# Patient Record
Sex: FEMALE | Race: WHITE | NOT HISPANIC OR LATINO | Employment: FULL TIME | URBAN - METROPOLITAN AREA
[De-identification: names, ages, dates, MRNs, and addresses within clinical notes are randomized per-mention and may not be internally consistent; named-entity substitution may affect disease eponyms.]

---

## 2017-01-03 ENCOUNTER — GENERIC CONVERSION - ENCOUNTER (OUTPATIENT)
Dept: OTHER | Facility: OTHER | Age: 32
End: 2017-01-03

## 2017-01-03 ENCOUNTER — ALLSCRIPTS OFFICE VISIT (OUTPATIENT)
Dept: OTHER | Facility: OTHER | Age: 32
End: 2017-01-03

## 2017-01-09 ENCOUNTER — HOSPITAL ENCOUNTER (OUTPATIENT)
Facility: HOSPITAL | Age: 32
Discharge: HOME | End: 2017-01-09
Attending: OBSTETRICS & GYNECOLOGY | Admitting: OBSTETRICS & GYNECOLOGY
Payer: COMMERCIAL

## 2017-01-09 VITALS
SYSTOLIC BLOOD PRESSURE: 103 MMHG | HEART RATE: 78 BPM | TEMPERATURE: 97.8 F | WEIGHT: 192 LBS | DIASTOLIC BLOOD PRESSURE: 58 MMHG

## 2017-01-09 DIAGNOSIS — Z33.1 PREGNANT STATE, INCIDENTAL: ICD-10-CM
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
## 2017-01-09 PROBLEM — Z3A.30 30 WEEKS GESTATION OF PREGNANCY: Status: ACTIVE | Noted: 2017-01-09

## 2017-01-09 PROCEDURE — 99204 OFFICE O/P NEW MOD 45 MIN: CPT

## 2017-01-09 RX ORDER — MULTIVITAMIN
1 TABLET ORAL DAILY
COMMUNITY
End: 2017-02-14 | Stop reason: ALTCHOICE

## 2017-01-20 ENCOUNTER — GENERIC CONVERSION - ENCOUNTER (OUTPATIENT)
Dept: OTHER | Facility: OTHER | Age: 32
End: 2017-01-20

## 2017-01-30 ENCOUNTER — GENERIC CONVERSION - ENCOUNTER (OUTPATIENT)
Dept: OTHER | Facility: OTHER | Age: 32
End: 2017-01-30

## 2017-02-03 ENCOUNTER — GENERIC CONVERSION - ENCOUNTER (OUTPATIENT)
Dept: OTHER | Facility: OTHER | Age: 32
End: 2017-02-03

## 2017-02-17 ENCOUNTER — ALLSCRIPTS OFFICE VISIT (OUTPATIENT)
Dept: OTHER | Facility: OTHER | Age: 32
End: 2017-02-17

## 2017-02-19 LAB — STREP GRP B, MOLECULAR (HISTORICAL): NEGATIVE

## 2017-02-20 LAB — EXTERNAL GROUP B STREP ANTIGEN: NEGATIVE

## 2017-02-24 ENCOUNTER — ALLSCRIPTS OFFICE VISIT (OUTPATIENT)
Dept: OTHER | Facility: OTHER | Age: 32
End: 2017-02-24

## 2017-02-27 ENCOUNTER — GENERIC CONVERSION - ENCOUNTER (OUTPATIENT)
Dept: OTHER | Facility: OTHER | Age: 32
End: 2017-02-27

## 2017-03-07 ENCOUNTER — GENERIC CONVERSION - ENCOUNTER (OUTPATIENT)
Dept: OTHER | Facility: OTHER | Age: 32
End: 2017-03-07

## 2017-03-15 ENCOUNTER — GENERIC CONVERSION - ENCOUNTER (OUTPATIENT)
Dept: OTHER | Facility: OTHER | Age: 32
End: 2017-03-15

## 2017-03-21 ENCOUNTER — GENERIC CONVERSION - ENCOUNTER (OUTPATIENT)
Dept: OTHER | Facility: OTHER | Age: 32
End: 2017-03-21

## 2017-03-23 ENCOUNTER — ANESTHESIA EVENT (INPATIENT)
Dept: LABOR AND DELIVERY | Facility: HOSPITAL | Age: 32
End: 2017-03-23
Payer: COMMERCIAL

## 2017-03-23 ENCOUNTER — HOSPITAL ENCOUNTER (INPATIENT)
Facility: HOSPITAL | Age: 32
LOS: 2 days | Discharge: HOME | End: 2017-03-25
Attending: OBSTETRICS & GYNECOLOGY | Admitting: OBSTETRICS & GYNECOLOGY
Payer: COMMERCIAL

## 2017-03-23 ENCOUNTER — ANESTHESIA (INPATIENT)
Dept: LABOR AND DELIVERY | Facility: HOSPITAL | Age: 32
End: 2017-03-23
Payer: COMMERCIAL

## 2017-03-23 ENCOUNTER — HOSPITAL ENCOUNTER (OUTPATIENT)
Dept: LABOR AND DELIVERY | Facility: HOSPITAL | Age: 32
Discharge: HOME | End: 2017-03-23
Payer: COMMERCIAL

## 2017-03-23 DIAGNOSIS — Z3A.41 41 WEEKS GESTATION OF PREGNANCY: Primary | ICD-10-CM

## 2017-03-23 PROBLEM — O48.0 41 WEEKS GESTATION OF PREGNANCY: Status: ACTIVE | Noted: 2017-03-23

## 2017-03-23 LAB
ABO GROUP BLD: NORMAL
BASE EXCESS BLDCOA CALC-SCNC: -3.4 MMOL/L (ref 3–11)
BASE EXCESS BLDCOV CALC-SCNC: -4.5 MMOL/L (ref 1–9)
BLD GP AB SCN SERPL QL: NEGATIVE
ERYTHROCYTE [DISTWIDTH] IN BLOOD BY AUTOMATED COUNT: 13.6 % (ref 11.6–15.1)
HCO3 BLDCOA-SCNC: 25.4 MMOL/L (ref 17.3–27.3)
HCO3 BLDCOV-SCNC: 22.3 MMOL/L (ref 12.2–28.6)
HCT VFR BLD AUTO: 36.2 % (ref 34.8–46.1)
HGB BLD-MCNC: 12 G/DL (ref 11.5–15.4)
MCH RBC QN AUTO: 31.2 PG (ref 26.8–34.3)
MCHC RBC AUTO-ENTMCNC: 33.1 G/DL (ref 31.4–37.4)
MCV RBC AUTO: 94 FL (ref 82–98)
O2 CT VFR BLDCOA CALC: 4 ML/DL
OXYHGB MFR BLDCOA: 16.4 %
OXYHGB MFR BLDCOV: 41 %
PCO2 BLDCOA: 60 MM[HG] (ref 30–60)
PCO2 BLDCOV: 46.6 MM HG (ref 27–43)
PH BLDCOA: 7.24 [PH] (ref 7.23–7.43)
PH BLDCOV: 7.3 [PH] (ref 7.19–7.49)
PLATELET # BLD AUTO: 238 THOUSANDS/UL (ref 149–390)
PMV BLD AUTO: 10.5 FL (ref 8.9–12.7)
PO2 BLDCOA: 13 MM HG (ref 5–25)
PO2 BLDCOV: 21.3 MM HG (ref 15–45)
RBC # BLD AUTO: 3.85 MILLION/UL (ref 3.81–5.12)
RH BLD: POSITIVE
SAO2 % BLDCOV: 9.8 ML/DL
WBC # BLD AUTO: 11.14 THOUSAND/UL (ref 4.31–10.16)

## 2017-03-23 PROCEDURE — 86592 SYPHILIS TEST NON-TREP QUAL: CPT | Performed by: OBSTETRICS & GYNECOLOGY

## 2017-03-23 PROCEDURE — 86900 BLOOD TYPING SEROLOGIC ABO: CPT | Performed by: OBSTETRICS & GYNECOLOGY

## 2017-03-23 PROCEDURE — 86850 RBC ANTIBODY SCREEN: CPT | Performed by: OBSTETRICS & GYNECOLOGY

## 2017-03-23 PROCEDURE — 85027 COMPLETE CBC AUTOMATED: CPT | Performed by: OBSTETRICS & GYNECOLOGY

## 2017-03-23 PROCEDURE — 82805 BLOOD GASES W/O2 SATURATION: CPT | Performed by: OBSTETRICS & GYNECOLOGY

## 2017-03-23 PROCEDURE — 86901 BLOOD TYPING SEROLOGIC RH(D): CPT | Performed by: OBSTETRICS & GYNECOLOGY

## 2017-03-23 RX ORDER — ONDANSETRON 2 MG/ML
4 INJECTION INTRAMUSCULAR; INTRAVENOUS EVERY 4 HOURS PRN
Status: DISCONTINUED | OUTPATIENT
Start: 2017-03-23 | End: 2017-03-23

## 2017-03-23 RX ORDER — ECHINACEA PURPUREA EXTRACT 125 MG
1 TABLET ORAL AS NEEDED
Status: DISCONTINUED | OUTPATIENT
Start: 2017-03-23 | End: 2017-03-25 | Stop reason: HOSPADM

## 2017-03-23 RX ORDER — OXYCODONE HYDROCHLORIDE AND ACETAMINOPHEN 5; 325 MG/1; MG/1
1 TABLET ORAL EVERY 4 HOURS PRN
Status: DISCONTINUED | OUTPATIENT
Start: 2017-03-23 | End: 2017-03-25 | Stop reason: HOSPADM

## 2017-03-23 RX ORDER — ACETAMINOPHEN 325 MG/1
650 TABLET ORAL EVERY 6 HOURS PRN
Status: DISCONTINUED | OUTPATIENT
Start: 2017-03-23 | End: 2017-03-25 | Stop reason: HOSPADM

## 2017-03-23 RX ORDER — GUAIFENESIN/DEXTROMETHORPHAN 100-10MG/5
10 SYRUP ORAL EVERY 4 HOURS PRN
Status: DISCONTINUED | OUTPATIENT
Start: 2017-03-23 | End: 2017-03-25 | Stop reason: HOSPADM

## 2017-03-23 RX ORDER — ONDANSETRON 2 MG/ML
4 INJECTION INTRAMUSCULAR; INTRAVENOUS EVERY 8 HOURS PRN
Status: DISCONTINUED | OUTPATIENT
Start: 2017-03-23 | End: 2017-03-25 | Stop reason: HOSPADM

## 2017-03-23 RX ORDER — IBUPROFEN 600 MG/1
600 TABLET ORAL EVERY 6 HOURS PRN
Status: DISCONTINUED | OUTPATIENT
Start: 2017-03-23 | End: 2017-03-25 | Stop reason: HOSPADM

## 2017-03-23 RX ORDER — CALCIUM CARBONATE 200(500)MG
1000 TABLET,CHEWABLE ORAL DAILY PRN
Status: DISCONTINUED | OUTPATIENT
Start: 2017-03-23 | End: 2017-03-25 | Stop reason: HOSPADM

## 2017-03-23 RX ORDER — ECHINACEA PURPUREA EXTRACT 125 MG
1 TABLET ORAL
Status: DISCONTINUED | OUTPATIENT
Start: 2017-03-23 | End: 2017-03-23

## 2017-03-23 RX ORDER — OXYCODONE HYDROCHLORIDE AND ACETAMINOPHEN 5; 325 MG/1; MG/1
2 TABLET ORAL EVERY 4 HOURS PRN
Status: DISCONTINUED | OUTPATIENT
Start: 2017-03-23 | End: 2017-03-25 | Stop reason: HOSPADM

## 2017-03-23 RX ORDER — DIPHENHYDRAMINE HYDROCHLORIDE 50 MG/ML
25 INJECTION INTRAMUSCULAR; INTRAVENOUS EVERY 6 HOURS PRN
Status: DISCONTINUED | OUTPATIENT
Start: 2017-03-23 | End: 2017-03-23

## 2017-03-23 RX ORDER — DOCUSATE SODIUM 100 MG/1
100 CAPSULE, LIQUID FILLED ORAL 2 TIMES DAILY
Status: DISCONTINUED | OUTPATIENT
Start: 2017-03-23 | End: 2017-03-25 | Stop reason: HOSPADM

## 2017-03-23 RX ORDER — OXYTOCIN/RINGER'S LACTATE 30/500 ML
1-30 PLASTIC BAG, INJECTION (ML) INTRAVENOUS
Status: DISCONTINUED | OUTPATIENT
Start: 2017-03-23 | End: 2017-03-23

## 2017-03-23 RX ORDER — DIAPER,BRIEF,INFANT-TODD,DISP
1 EACH MISCELLANEOUS AS NEEDED
Status: DISCONTINUED | OUTPATIENT
Start: 2017-03-23 | End: 2017-03-25 | Stop reason: HOSPADM

## 2017-03-23 RX ORDER — BUPIVACAINE HYDROCHLORIDE 2.5 MG/ML
INJECTION, SOLUTION INFILTRATION; PERINEURAL AS NEEDED
Status: DISCONTINUED | OUTPATIENT
Start: 2017-03-23 | End: 2017-03-23 | Stop reason: SURG

## 2017-03-23 RX ORDER — BUTORPHANOL TARTRATE 1 MG/ML
1 INJECTION, SOLUTION INTRAMUSCULAR; INTRAVENOUS ONCE
Status: COMPLETED | OUTPATIENT
Start: 2017-03-23 | End: 2017-03-23

## 2017-03-23 RX ORDER — PROMETHAZINE HYDROCHLORIDE 25 MG/ML
12.5 INJECTION, SOLUTION INTRAMUSCULAR; INTRAVENOUS ONCE
Status: COMPLETED | OUTPATIENT
Start: 2017-03-23 | End: 2017-03-23

## 2017-03-23 RX ORDER — FENTANYL CITRATE 50 UG/ML
INJECTION, SOLUTION INTRAMUSCULAR; INTRAVENOUS
Status: COMPLETED
Start: 2017-03-23 | End: 2017-03-23

## 2017-03-23 RX ORDER — METOCLOPRAMIDE HYDROCHLORIDE 5 MG/ML
5 INJECTION INTRAMUSCULAR; INTRAVENOUS EVERY 6 HOURS PRN
Status: DISCONTINUED | OUTPATIENT
Start: 2017-03-23 | End: 2017-03-23

## 2017-03-23 RX ORDER — SODIUM CHLORIDE, SODIUM LACTATE, POTASSIUM CHLORIDE, CALCIUM CHLORIDE 600; 310; 30; 20 MG/100ML; MG/100ML; MG/100ML; MG/100ML
125 INJECTION, SOLUTION INTRAVENOUS CONTINUOUS
Status: DISCONTINUED | OUTPATIENT
Start: 2017-03-23 | End: 2017-03-23

## 2017-03-23 RX ADMIN — Medication 8 ML/HR: at 15:56

## 2017-03-23 RX ADMIN — BUPIVACAINE HYDROCHLORIDE 1 ML: 2.5 INJECTION, SOLUTION INFILTRATION; PERINEURAL at 15:56

## 2017-03-23 RX ADMIN — IBUPROFEN 600 MG: 600 TABLET, FILM COATED ORAL at 21:02

## 2017-03-23 RX ADMIN — MENTHOL 5.4 MG: 5.4 LOZENGE ORAL at 22:02

## 2017-03-23 RX ADMIN — SODIUM CHLORIDE, SODIUM LACTATE, POTASSIUM CHLORIDE, AND CALCIUM CHLORIDE 125 ML/HR: .6; .31; .03; .02 INJECTION, SOLUTION INTRAVENOUS at 10:25

## 2017-03-23 RX ADMIN — WITCH HAZEL 1 PAD: 500 SOLUTION RECTAL; TOPICAL at 20:51

## 2017-03-23 RX ADMIN — Medication 1 SPRAY: at 10:05

## 2017-03-23 RX ADMIN — BENZOCAINE AND MENTHOL: 20; .5 SPRAY TOPICAL at 20:51

## 2017-03-23 RX ADMIN — BUPIVACAINE HYDROCHLORIDE 5 ML: 2.5 INJECTION, SOLUTION INFILTRATION; PERINEURAL at 16:46

## 2017-03-23 RX ADMIN — FENTANYL CITRATE 10 MCG: 50 INJECTION INTRAMUSCULAR; INTRAVENOUS at 15:56

## 2017-03-23 RX ADMIN — SODIUM CHLORIDE, SODIUM LACTATE, POTASSIUM CHLORIDE, AND CALCIUM CHLORIDE 125 ML/HR: .6; .31; .03; .02 INJECTION, SOLUTION INTRAVENOUS at 09:00

## 2017-03-23 RX ADMIN — PROMETHAZINE HYDROCHLORIDE 12.5 MG: 25 INJECTION, SOLUTION INTRAMUSCULAR; INTRAVENOUS at 13:30

## 2017-03-23 RX ADMIN — BUTORPHANOL TARTRATE 1 MG: 1 INJECTION, SOLUTION INTRAMUSCULAR; INTRAVENOUS at 13:29

## 2017-03-23 RX ADMIN — Medication 2 MILLI-UNITS/MIN: at 09:27

## 2017-03-23 RX ADMIN — HYDROCORTISONE 1 APPLICATION: 10 CREAM TOPICAL at 20:51

## 2017-03-23 RX ADMIN — SODIUM CHLORIDE, SODIUM LACTATE, POTASSIUM CHLORIDE, AND CALCIUM CHLORIDE 125 ML/HR: .6; .31; .03; .02 INJECTION, SOLUTION INTRAVENOUS at 16:00

## 2017-03-23 RX ADMIN — DOCUSATE SODIUM 100 MG: 100 CAPSULE, LIQUID FILLED ORAL at 20:51

## 2017-03-24 LAB — RPR SER QL: NORMAL

## 2017-03-24 PROCEDURE — 3E033VJ INTRODUCTION OF OTHER HORMONE INTO PERIPHERAL VEIN, PERCUTANEOUS APPROACH: ICD-10-PCS | Performed by: OBSTETRICS & GYNECOLOGY

## 2017-03-24 PROCEDURE — 10907ZC DRAINAGE OF AMNIOTIC FLUID, THERAPEUTIC FROM PRODUCTS OF CONCEPTION, VIA NATURAL OR ARTIFICIAL OPENING: ICD-10-PCS | Performed by: OBSTETRICS & GYNECOLOGY

## 2017-03-24 PROCEDURE — 0HQ9XZZ REPAIR PERINEUM SKIN, EXTERNAL APPROACH: ICD-10-PCS | Performed by: OBSTETRICS & GYNECOLOGY

## 2017-03-24 RX ADMIN — DOCUSATE SODIUM 100 MG: 100 CAPSULE, LIQUID FILLED ORAL at 17:24

## 2017-03-24 RX ADMIN — ACETAMINOPHEN 650 MG: 325 TABLET, FILM COATED ORAL at 12:19

## 2017-03-24 RX ADMIN — GUAIFENESIN AND DEXTROMETHORPHAN 10 ML: 100; 10 SYRUP ORAL at 00:36

## 2017-03-24 RX ADMIN — DOCUSATE SODIUM 100 MG: 100 CAPSULE, LIQUID FILLED ORAL at 09:51

## 2017-03-25 VITALS
HEIGHT: 64 IN | TEMPERATURE: 97.8 F | OXYGEN SATURATION: 99 % | BODY MASS INDEX: 37.56 KG/M2 | DIASTOLIC BLOOD PRESSURE: 63 MMHG | HEART RATE: 70 BPM | SYSTOLIC BLOOD PRESSURE: 110 MMHG | RESPIRATION RATE: 18 BRPM | WEIGHT: 220 LBS

## 2017-03-25 RX ORDER — DOCUSATE SODIUM 100 MG/1
100 CAPSULE, LIQUID FILLED ORAL 2 TIMES DAILY
Qty: 60 CAPSULE | Refills: 0 | Status: SHIPPED | OUTPATIENT
Start: 2017-03-25 | End: 2022-04-27 | Stop reason: ALTCHOICE

## 2017-03-25 RX ORDER — ACETAMINOPHEN 325 MG/1
650 TABLET ORAL EVERY 6 HOURS PRN
Qty: 30 TABLET | Refills: 0 | Status: SHIPPED | OUTPATIENT
Start: 2017-03-25 | End: 2017-04-24

## 2017-03-25 RX ORDER — IBUPROFEN 600 MG/1
600 TABLET ORAL EVERY 6 HOURS PRN
Qty: 30 TABLET | Refills: 0 | Status: SHIPPED | OUTPATIENT
Start: 2017-03-25 | End: 2022-04-27 | Stop reason: ALTCHOICE

## 2017-03-25 RX ORDER — DIAPER,BRIEF,INFANT-TODD,DISP
1 EACH MISCELLANEOUS AS NEEDED
Qty: 30 G | Refills: 0 | Status: SHIPPED | OUTPATIENT
Start: 2017-03-25 | End: 2022-04-27 | Stop reason: ALTCHOICE

## 2017-03-25 RX ADMIN — DOCUSATE SODIUM 100 MG: 100 CAPSULE, LIQUID FILLED ORAL at 09:14

## 2017-03-31 LAB — PLACENTA IN STORAGE: NORMAL

## 2017-04-06 ENCOUNTER — ALLSCRIPTS OFFICE VISIT (OUTPATIENT)
Dept: OTHER | Facility: OTHER | Age: 32
End: 2017-04-06

## 2017-05-02 ENCOUNTER — ALLSCRIPTS OFFICE VISIT (OUTPATIENT)
Dept: OTHER | Facility: OTHER | Age: 32
End: 2017-05-02

## 2017-06-30 ENCOUNTER — ALLSCRIPTS OFFICE VISIT (OUTPATIENT)
Dept: OTHER | Facility: OTHER | Age: 32
End: 2017-06-30

## 2017-09-22 ENCOUNTER — GENERIC CONVERSION - ENCOUNTER (OUTPATIENT)
Dept: OTHER | Facility: OTHER | Age: 32
End: 2017-09-22

## 2018-01-10 NOTE — MISCELLANEOUS
Message   Recorded as Task   Date: 08/13/2016 10:55 AM, Created By: Celine Winston   Task Name: Review Document   Assigned To: Lizeth Pearson   Regarding Patient: Amanda Bowman, Status: Active   CommentTwmargaret Pérez - 13 Aug 2016 10:55 AM     TASK CREATED  please call Giana Warren- CF is negative   [place] - 15 Aug 2016 10:05 AM     TASK REPLIED TO: Previously Assigned To [facility],Team  Per communication consent LM on pts personal VMail with results  Active Problems    1  Encounter for supervision of normal first pregnancy in first trimester (V22 0) (Z34 01)   2  Encounter of female for testing for genetic disease carrier status for procreative   management (V26 31) (Z31 430)   3  H/O LEEP (loop electrosurgical excision procedure) of cervix complicating pregnancy   (654 60,V45 89) (O34 40,Z98 89)   4  Irregular menstrual cycle (626 4) (N92 6)   5  Migraine headache (346 90) (G43 909)   6  Need for prophylactic vaccination and inoculation against influenza (V04 81) (Z23)   7  Need for prophylactic vaccination with combined diphtheria-tetanus-pertussis (DTaP)   vaccine (V06 1) (Z23)    Current Meds   1  Folic Acid CAPS; Therapy: (Recorded:03Pek2159) to Recorded   2  One-A-Day Womens Prenatal 28-0 8 & 440 MG Oral Miscellaneous; as directed; Therapy: 13Lgw2271 to Recorded    Allergies    1  No Known Drug Allergies    2  No Known Environmental Allergies   3   No Known Food Allergies    Signatures   Electronically signed by : Myrtle Mata, ; Aug 15 2016 10:05AM EST                       (Author)

## 2018-01-10 NOTE — MISCELLANEOUS
Message   Recorded as Task   Date: 08/05/2016 07:50 PM, Created By: Yudi Pereira   Task Name: Go to Result   Assigned To: Yudi Pereira   Regarding Patient: Sincere Fitch, Status: Active   CommentAvis Aguilar - 05 Aug 2016 7:50 PM     TASK CREATED  please call Adriano Ferrer cultures negative   Milady Hernandez - 08 Aug 2016 8:44 AM     TASK REPLIED TO: Previously Assigned To [facility],Team                      p/c to pt aware  Active Problems    1  Encounter for supervision of normal first pregnancy in first trimester (V22 0) (Z34 01)   2  Encounter of female for testing for genetic disease carrier status for procreative   management (V26 31) (Z31 430)   3  H/O LEEP (loop electrosurgical excision procedure) of cervix complicating pregnancy   (654 60,V45 89) (O34 40,Z98 89)   4  Irregular menstrual cycle (626 4) (N92 6)   5  Migraine headache (346 90) (G43 909)   6  Need for prophylactic vaccination and inoculation against influenza (V04 81) (Z23)   7  Need for prophylactic vaccination with combined diphtheria-tetanus-pertussis (DTaP)   vaccine (V06 1) (Z23)    Current Meds   1  Folic Acid CAPS; Therapy: (Recorded:57Vvh7423) to Recorded   2  One-A-Day Womens Prenatal 28-0 8 & 440 MG Oral Miscellaneous; as directed; Therapy: 32Cgj3794 to Recorded    Allergies    1  No Known Drug Allergies    2  No Known Environmental Allergies   3   No Known Food Allergies    Signatures   Electronically signed by : Hannah Leonard, ; Aug  8 2016  8:48AM EST                       (Author)

## 2018-01-10 NOTE — MISCELLANEOUS
Message   Recorded as Task   Date: 12/20/2016 02:24 PM, Created By: Abdirizak Fisher   Task Name: Call Back   Assigned To: [facility],Team   Regarding Patient: Deepa Chavarria, Status: Active   Comment:    Christel Pierre - 20 Dec 2016 2:24 PM     TASK CREATED  Caller: Self; Results Inquiry; [phone] (Home)  Pt calling for result of glucose test   Pt is @ [phone]   DavidMilady - 20 Dec 2016 2:38 PM     TASK REPLIED TO: Previously Assigned To [facility],Team   spoke w/ pt aware of results  Active Problems    1  Encounter for supervision of normal first pregnancy in second trimester (V22 0) (Z34 02)   2  H/O LEEP (loop electrosurgical excision procedure) of cervix complicating pregnancy   (654 60,V45 89) (O34 40,Z98 890)   3  Migraine headache (346 90) (G43 909)   4  Need for prophylactic vaccination and inoculation against influenza (V04 81) (Z23)   5  Need for prophylactic vaccination with combined diphtheria-tetanus-pertussis (DTaP)   vaccine (V06 1) (Z23)   6  Pregnancy (V22 2) (Z33 1)    Current Meds   1  Folic Acid CAPS; Therapy: (Recorded:07Pqp6084) to Recorded   2  One-A-Day Womens Prenatal 28-0 8 & 440 MG Oral Miscellaneous; as directed; Therapy: 44Fzv7196 to Recorded    Allergies    1  No Known Drug Allergies    2  No Known Environmental Allergies   3   No Known Food Allergies    Signatures   Electronically signed by : Hannah Leonard, ; Dec 20 2016  2:38PM EST                       (Author)

## 2018-01-11 NOTE — MISCELLANEOUS
Message  Pt called to ask when it would be ok to fly in pregnancy  Pt was advised not to fly after 35-36wks and only if pregnancy is uncomplicated at that time  Pt also to check with airline about their specific regulations  Pt will get note from us if she plans to travel  Advised to avoid [place] due to [place]  She is considering flight to [place]  Active Problems    1  Encounter for prenatal care of first pregnancy, third trimester (V22 0) (Z34 03)   2  H/O LEEP (loop electrosurgical excision procedure) of cervix complicating pregnancy   (654 60,V45 89) (O34 40,Z98 890)   3  Migraine headache (346 90) (G43 909)   4  Need for prophylactic vaccination and inoculation against influenza (V04 81) (Z23)   5  Need for prophylactic vaccination with combined diphtheria-tetanus-pertussis (DTaP)   vaccine (V06 1) (Z23)   6  Pregnancy (V22 2) (Z33 1)    Current Meds   1  Folic Acid CAPS; Therapy: (Recorded:38Cem4907) to Recorded   2  One-A-Day Womens Prenatal 28-0 8 & 440 MG Oral Miscellaneous; as directed; Therapy: 11Bbs9563 to Recorded    Allergies    1  No Known Drug Allergies    2  No Known Environmental Allergies   3   No Known Food Allergies    Signatures   Electronically signed by : Live Morataya, ; [place]  3 2017  4:15PM EST                       (Author)

## 2018-01-12 VITALS
HEIGHT: 65 IN | SYSTOLIC BLOOD PRESSURE: 110 MMHG | BODY MASS INDEX: 35.82 KG/M2 | DIASTOLIC BLOOD PRESSURE: 70 MMHG | WEIGHT: 215 LBS

## 2018-01-12 NOTE — MISCELLANEOUS
Message  p/c from PT stating this am she woke with red vag spotting and also notice some in the toilet  PT is also having some cramping  ApPT for today E? B ofc @ 1340 hrs  I instructed PT to hydrate, rest until apPT  Bleeding precautions given to PT  PT verbalized understanding  Active Problems    1  Encounter for supervision of normal first pregnancy in first trimester (V22 0) (Z34 01)   2  Encounter of female for testing for genetic disease carrier status for procreative   management (V26 31) (Z31 430)   3  H/O LEEP (loop electrosurgical excision procedure) of cervix complicating pregnancy   (654 60,V45 89) (O34 40,Z98 89)   4  Irregular menstrual cycle (626 4) (N92 6)   5  Migraine headache (346 90) (G43 909)   6  Need for prophylactic vaccination and inoculation against influenza (V04 81) (Z23)   7  Need for prophylactic vaccination with combined diphtheria-tetanus-pertussis (DTaP)   vaccine (V06 1) (Z23)    Current Meds   1  Folic Acid CAPS; Therapy: (Recorded:76Asu0433) to Recorded   2  One-A-Day Womens Prenatal 28-0 8 & 440 MG Oral Miscellaneous; as directed; Therapy: 12Bur6754 to Recorded    Allergies    1  No Known Drug Allergies    2  No Known Environmental Allergies   3   No Known Food Allergies    Signatures   Electronically signed by : Quoc Chaudhary, ; Aug 17 2016  9:02AM EST                       (Author)

## 2018-01-13 VITALS
SYSTOLIC BLOOD PRESSURE: 122 MMHG | BODY MASS INDEX: 33.46 KG/M2 | WEIGHT: 196 LBS | HEIGHT: 64 IN | DIASTOLIC BLOOD PRESSURE: 62 MMHG

## 2018-01-13 NOTE — MISCELLANEOUS
Message   Recorded as Task   Date: 05/11/2016 08:30 AM, Created By: Aravind Balderas   Task Name: Call Back   Assigned To: Frank Colin   Regarding Patient: Shahram Olmedo, Status: In Progress   Comment:    Aravind Balderas - 11 May 2016 8:30 AM     TASK CREATED  Caller: Self; General Medical Question; [phone] (Mobile Phone)  pt called - wants to talk with you regarding last visit & updates - please call  [phone]   Bouchra Espinosa - 11 May 2016 9:00 AM     TASK REPLIED TO: Previously Assigned To [facility],Team  can you please find out what's going on? Thanks! Annika Glory - 11 May 2016 9:11 AM     TASK IN PROGRESS   Annika Glory - 11 May 2016 9:16 AM     TASK EDITED  Pt actively trying to conceive  She went 6 mos with no period - a few mos before yly and then after  Did get menses in March and April  LMP 4/18 thru 4/22  Pt interested in meds to regulate and help her to conceive  Thanks   Bouchra Espinosa - 11 May 2016 9:57 AM     TASK REPLIED TO: Previously Assigned To Bouchra Espinosa  At her yearly I offered her either Clomid or Glucophage  Has she decided on which one? If not, needs appt to discuss  Annika Glory - 11 May 2016 10:45 AM     TASK EDITED  She wants the one that just regulates her periods  I think it is metformin  How to rx? Bouchra Espinosa - 11 May 2016 10:48 AM     TASK REPLIED TO: Previously Assigned To Bouchra Espinosa  metformin 500mg po daily x 7 days then BID x 7 days then TID  If she develops diarrhea, go back to last dose that she was doing okay on  Should regulate cycles within 3 months  She should return in 3 months to discuss  Annika Glory - 11 May 2016 11:26 AM     TASK EDITED  lmtcb   Annika Espository - 11 May 2016 11:30 AM     TASK EDITED  lmtcb  Rx already in [address] [place] [address] - 11 May 2016 11:53 AM     TASK EDITED  Pt wants rx to [facility], [place] cancelled previous rx  Pt [facility]        Active Problems    1  Contraceptive use (V25 40) (Z30 40)   2  Encounter for gynecological examination without abnormal finding (V72 31) (Z01 419)   3  Encounter for routine gynecological examination (V72 31) (Z01 419)   4  Irregular menstrual cycle (626 4) (N92 6)   5  Screening for human papillomavirus (HPV) (V73 81) (Z11 51)    Allergies    1  No Known Drug Allergies    Plan  Irregular menstrual cycle    · MetFORMIN HCl - 500 MG Oral Tablet; Sig 1 od x 7 days, then bid x 7 days, then  tid x 7 days  Second month and thereafter will be tid    Signatures   Electronically signed by :  Essie Banerjee, ; May 11 2016 11:53AM EST                       (Author)

## 2018-01-13 NOTE — PROGRESS NOTES
2016         RE: Melissa Comp                                To: [facility]  MR#: [id number]                                     [address]   : 1985                                   [address]   ENC: [id number]                             [place], [address]   (Exam #: [id number])                           Fax: [phone]      The LMP of this 27year old,  G1, P0-0-0-0 patient was unknown, her   working MICK is MAR 12 2017 and the current gestational age is 20 weeks 5   days by [address] [place] [address]  A sonographic examination was performed on 2016 using real time equipment  The ultrasound examination was   performed using abdominal technique  The patient has a BMI of 30 6  Her   blood pressure today was 101/61  Earliest ultrasound found in her record: 8/3/16  7w6d  3/16/17 MICK      Problem list:   1  History of a LEEP completed in May 2006      Cardiac motion was observed at 155 bpm       INDICATIONS      Evaluate missed anatomy      Exam Types      Level I      RESULTS      Fetus # 1 of 1   Breech presentation   Placenta Location = Anterior   No placenta previa   Placenta Grade = I      AMNIOTIC FLUID         Largest Vertical Pocket = 5 0 cm   Amniotic Fluid: Normal      ANATOMY DETAILS      Visualized Appearing Sonographically Normal:   SPINE: (Cervical Spine, Thoracic Spine, Lumbar Spine, Sacrum)      ANATOMY COMMENTS      The prior fetal anatomic survey was limited the area of the spine  These   anatomic views were seen today as sonographically normal within the   inherent limitations of fetal ultrasound  IMPRESSION      Prieto IUP   23 weeks and 5 days by [address] [place] [address] Sono  (MICK=MAR 16 2017)   Breech presentation   Regular fetal heart rate of 155 bpm   Anterior placenta   No placenta previa      GENERAL COMMENT      OFFICE VISIT      On exam today the patient appears well, in no acute distress, and denies   any complaints    Her abdomen is non-tender  The fetal anatomic survey is now complete  There is no sonographic   evidence of fetal abnormalities at this time  The remainder of the survey   was completed on November 1  Good fetal movement and tone are seen  The   amniotic fluid volume appears normal   The placenta is anterior and it   appears sonographically normal   The patient was informed of today's   findings and all of her questions were answered  The limitations of   ultrasound were reviewed with the patient, which she appears to understand  The patient had many questions regarding trouble sleeping during   pregnancy  I discussed with her good sleep habits including not eating   exercise immediately before sleeping  I also recommended she decrease any   television or "screen time" prior to sleeping to allow her brain to wind   down slowly  We discussed the use of various sleep aids including but not   limited to doxylamine or antihistamine such as Benadryl  I did recommend   she utilize these sparingly if she needs to go  Recommend the patient return as clinically indicated  Please note, in addition to the time spent discussing the results of the   ultrasound, approximately 10 minutes were spent in consultation with the   patient and coordination of care, with greater than 50% of the time spent   in direct face-to-face counseling  Thank you very much for allowing us to participate in the care of this   very nice patient  Should you have any questions, please do not hesitate   to contact our office  JACQUELINE Turner M D     Electronically signed 11/22/16 08:47           Electronically signed Denisse Humphries MD  Dec 13 2016  4:42PM EST

## 2018-01-13 NOTE — PROGRESS NOTES
SEP 7 2016         RE: Sebastien Crumbly                        To: [facility]  MR#: [id number]                                     [address]   : 1985                                   [address]   ENC: [id number] Monet, [address]   (Exam #: [id number])                           Fax: [phone]      The LMP of this 27year old,  G1, P0-0-0-0 patient was MAY 8 2016, her   working MICK is MAR 16 2017 and the current gestational age is 16 weeks 6   days by 1st Trimester Sono  A sonographic examination was performed on SEP   7 2016 using real time equipment  The ultrasound examination was performed   using abdominal & vaginal techniques  The patient has a BMI of 27 6  Her   blood pressure today was 102/65  Earliest ultrasound found in her record: 8/3/16  7w6d  3/16/17 MICK      Thank you very much for referring this very nice patient for a    consultation and genetic screening ultrasound  This is the patient's   first pregnancy  Her medical history is significant for occasional   migraines and her surgical history is significant for a LEEP or seizure   performed in May 2006  She denies the current use of tobacco, alcohol, or   drugs  Her medications include Tylenol when necessary, folic acid, and   prenatal vitamins  She has no significant drug allergies  Her family   medical history is significant for a nephew of her  who was born   with spina bifida who had in utero surgery at the [facility]  He is currently doing well  No other first-degree family   members have significant congenital birth defects  A review of systems is   otherwise negative  On exam, the patient appears well, in no acute   distress, and her abdomen is nontender  Multiple longitudinal and transverse sections revealed a swain   intrauterine pregnancy with the fetus in variable presentation   The   placenta is posterior in implantation, grade 0 in appearance, and there is   no placenta previa  Cardiac motion was observed at 153 bpm       INDICATIONS      first trimester genetic screening   prior LEEP      Exam Types      Level I      RESULTS      Fetus # 1 of 1   Variable presentation   Fetal growth appeared normal      MEASUREMENTS (* Included In Average GA)      CRL              6 7 cm        12 weeks 6 days*   Nuchal Trans    1 40 mm      THE AVERAGE GESTATIONAL AGE is 12 weeks 6 days +/- 7 days  UTERINE ARTERIES                                  S/D   PI    RI    NOTCH       Left Uterine Artery              1 49         No       Right Uterine Artery             1 67         No      ANATOMY COMMENTS      Anatomic detail is limited at this gestational age  The yolk sac was not   noted  The fetal cranium appeared normal in shape and the nuchal   translucency was normal in size (1 4mm)  The nasal bone appears to be   present  The intracranial anatomy was unremarkable  Evaluation of the   spine revealed no obvious evidence for a neural tube defect  Anatomy of   the fetal thorax appeared within normal limits  The cardiac rhythm was   regular  Within the abdomen, stomach & bladder were visualized and the   abdominal wall appeared intact  Active movement of the fetal body &   extremities was seen  There is no suspicion of a subchorionic bleed  The   placental cord insertion was normal    The uterine artery Dopplers are   normal for this gestational age  There is no suspicion of a uterine myoma  Free fluid is not seen in the posterior cul-de-sac  ADNEXA      The left ovary appeared normal and measured 4 2 x 1 9 x 2 5 cm with a   volume of 10 4 cc  The right ovary appeared normal and measured 3 0 x 1 7   x 2 1 cm with a volume of 5 6 cc       AMNIOTIC FLUID         Largest Vertical Pocket = 3 9 cm   Amniotic Fluid: Normal      IMPRESSION      Prieto IUP   12 weeks and 6 days by this ultrasound  (MICK=MAR 16 2017)   12 weeks and 6 days by 1st Tri Sono  (MICK=MAR 16 2017)   Variable presentation   Fetal growth appeared normal   Regular fetal heart rate of 153 bpm   Posterior placenta   No placenta previa      GENERAL COMMENT      We discussed the options for genetic screening, including but not limited   to first trimester screening, second trimester screening, combined first   and second trimester screening, noninvasive prenatal testing (NIPT) for   patients at high risk and diagnostic screening through the use of CVS and   amniocentesis  We discussed the risks and benefits of each approach   including the sensitivities and false positive rates as well as the   difference between a screening test and a diagnostic test   At the   conclusion of our discussion the patient elected Sequential Screening to   delineate her risk for fetal aneuploidy  A maternal blood sample was   obtained on the day of sonogram   The first trimester portion of the   screening results, encompassing age, nuchal translucency, and biochemistry   should be available within one week of testing and will be reported from   [address] [place] [address]   The second stage of sequential screening should be completed   between the 15th and 21st week of pregnancy (ideally between 16-18 weeks)  I discussed with the patient that in general a history of having had a   LEEP procedure does not appear to significantly increase risk for early    birth and additional cervical surveillance other than universal   cervical length screening at 18-22 weeks is not necessary  Recommend an anatomy ultrasound be scheduled for 20 weeks gestation  Please note, in addition to the time spent discussing the results of the   ultrasound, I spent approximately 15 minutes of face-to-face time with the   patient, greater than 50% of which was spent in counseling and the   coordination of care for this patient        Thank you very much for allowing us to participate in the care of this   very nice patient  Should you have any questions, please do not hesitate   to contact our office  JACQUELINE Mnotalvo M D     Electronically signed 09/13/16 09:40

## 2018-01-14 VITALS
HEIGHT: 64 IN | DIASTOLIC BLOOD PRESSURE: 64 MMHG | TEMPERATURE: 99.1 F | BODY MASS INDEX: 34.66 KG/M2 | HEART RATE: 74 BPM | WEIGHT: 203 LBS | SYSTOLIC BLOOD PRESSURE: 122 MMHG | OXYGEN SATURATION: 98 %

## 2018-01-14 VITALS
HEIGHT: 64 IN | OXYGEN SATURATION: 96 % | SYSTOLIC BLOOD PRESSURE: 120 MMHG | DIASTOLIC BLOOD PRESSURE: 72 MMHG | HEART RATE: 74 BPM | WEIGHT: 202 LBS | RESPIRATION RATE: 14 BRPM | BODY MASS INDEX: 34.49 KG/M2

## 2018-01-14 VITALS
BODY MASS INDEX: 36.54 KG/M2 | DIASTOLIC BLOOD PRESSURE: 66 MMHG | WEIGHT: 214 LBS | HEIGHT: 64 IN | SYSTOLIC BLOOD PRESSURE: 112 MMHG

## 2018-01-15 VITALS
HEART RATE: 81 BPM | BODY MASS INDEX: 35.49 KG/M2 | DIASTOLIC BLOOD PRESSURE: 58 MMHG | WEIGHT: 213 LBS | TEMPERATURE: 97.9 F | SYSTOLIC BLOOD PRESSURE: 108 MMHG | OXYGEN SATURATION: 98 % | HEIGHT: 65 IN

## 2018-01-15 NOTE — MISCELLANEOUS
Message   Recorded as Task   Date: 01/26/2017 09:10 AM, Created By: Alvira Castleman   Task Name: Follow Up   Assigned To: Angus Bell   Regarding Patient: Jerrel Blizzard, Status: In Progress   Comment:    GabbyChristel - 26 Jan 2017 9:10 AM     TASK CREATED  Caller: Self; General Medical Question; [phone] (Home); [phone] (Work)  Pt is 32w and would like to know the side effects of the whooping cough shot  DavidMilady - 26 Jan 2017 9:40 AM     TASK REPLIED TO: Previously Assigned To [facility],Team   lmom to cb  pt received Tdap 1/20/17  Milady mcdaniel - 26 Jan 2017 9:40 AM     TASK IN PROGRESS        Active Problems    1  Encounter for prenatal care of first pregnancy, third trimester (V22 0) (Z34 03)   2  H/O LEEP (loop electrosurgical excision procedure) of cervix complicating pregnancy   (654 60,V45 89) (O34 40,Z98 890)   3  Migraine headache (346 90) (G43 909)   4  Need for prophylactic vaccination and inoculation against influenza (V04 81) (Z23)   5  Need for prophylactic vaccination with combined diphtheria-tetanus-pertussis (DTaP)   vaccine (V06 1) (Z23)   6  Pregnancy (V22 2) (Z33 1)    Current Meds   1  Folic Acid CAPS; Therapy: (Recorded:79Xpx8078) to Recorded   2  One-A-Day Womens Prenatal 28-0 8 & 440 MG Oral Miscellaneous; as directed; Therapy: 32Uvi8761 to Recorded    Allergies    1  No Known Drug Allergies    2  No Known Environmental Allergies   3   No Known Food Allergies    Signatures   Electronically signed by : Gene Paulino, ; Jan 30 2017  4:48PM EST                       (Author)

## 2018-01-16 NOTE — RESULT NOTES
Message   Please make sure a hard copy is signed off        Verified Results  (1) SEQUENTIAL SCREEN 2 12Oct2016 11:04AM Jennifer Vega     Test Name Result Flag Reference   SCAN RESULT      Results available in the [facility], [facility]

## 2018-01-16 NOTE — MISCELLANEOUS
Message   Recorded as Task   Date: 12/12/2016 10:46 AM, Created By: Dianne Butterfield   Task Name: Call Back   Assigned To: Bar Carmona   Regarding Patient: Marielena Merino, Status: Active   Comment:    Tammy Borden - 12 Dec 2016 10:46 AM     TASK CREATED  This patient would like for you to give her a call regarding her payments towards her deductible  Pt states she has reached the $1,500 deductible amount  The best contact # for pt is [phone]  Please advise  Bar Carmona - 12 Dec 2016 11:40 AM     TASK EDITED  s/w patient, she is going to continue paying since deductible starts over in January and she says it is going up to $2,500 00 in January  Active Problems    1  Encounter for supervision of normal first pregnancy in second trimester (V22 0) (Z34 02)   2  H/O LEEP (loop electrosurgical excision procedure) of cervix complicating pregnancy   (654 60,V45 89) (O34 40,Z98 890)   3  Migraine headache (346 90) (G43 909)   4  Need for prophylactic vaccination and inoculation against influenza (V04 81) (Z23)   5  Need for prophylactic vaccination with combined diphtheria-tetanus-pertussis (DTaP)   vaccine (V06 1) (Z23)   6  Pregnancy (V22 2) (Z33 1)    Current Meds   1  Folic Acid CAPS; Therapy: (Recorded:18Dfu2104) to Recorded   2  One-A-Day Womens Prenatal 28-0 8 & 440 MG Oral Miscellaneous; as directed; Therapy: 67Uxu1486 to Recorded    Allergies    1  No Known Drug Allergies    2  No Known Environmental Allergies   3   No Known Food Allergies    Signatures   Electronically signed by : Ramírez Ross, ; Dec 12 2016 11:40AM EST                       (Author)

## 2018-01-16 NOTE — RESULT NOTES
Verified Results  (1) CBC/PLT/DIFF [id number] 08:13AM Patti Garza Order Number: [id number]     Test Name Result Flag Reference   WBC COUNT 10 58 Thousand/uL H 4 31-10 16   RBC COUNT 3 58 Million/uL L 3 81-5 12   HEMOGLOBIN 11 5 g/dL  11 5-15 4   HEMATOCRIT 34 2 % L 34 8-46  1   MCV 96 fL  82-98   MCH 32 1 pg  26 8-34 3   MCHC 33 6 g/dL  31 4-37 4   RDW 12 7 %  11 6-15 1   MPV 10 0 fL  8 9-12 7   PLATELET COUNT 960 Thousands/uL  149-390   nRBC AUTOMATED 0 /100 WBCs     This is an appended report  These results have been appended to a previously preliminary verified report  - Patient Instructions: This bloodwork is non-fasting  Please drink two glasses of water morning of bloodwork  - Patient Instructions: This bloodwork is non-fasting  Please drink two glasses of water morning of bloodwork  This is an appended report  These results have been appended to a previously verified report  NEUTROPHILS - REL 79 % H 43-75   LYMPHOCYTES - REL 15 %  14-44   MONOCYTES - REL 5 %  4-12   EOSINOPHILS - REL 1 %  0-6   BASOPHILS - REL 0 %  0-1   NEUTROPHILS ABS 8 36 Thousand/uL H 1 85-7 62   LYMPHOTCYTES ABS 1 59 Thousand/uL  0 60-4 47   MONOCYTES ABS 0 53 Thousand/uL  0 00-1 22   EOSINOPHILS ABS 0 11 Thousand/uL  0 00-0 40   BASOPHILS ABS 0 00 Thousand/uL  0 00-0 10   TOTAL COUNTED      RBC MORPHOLOGY Present     Anisocytosis Present     Macrocytosis Present     PLT ESTIMATE Adequate  Adequate   - Patient Instructions: This bloodwork is non-fasting  Please drink two glasses of water morning of bloodwork  - Patient Instructions: This bloodwork is non-fasting  Please drink two glasses of water morning of bloodwork  (1) GLUCOSE, 1HR PG (50gm Glu Challenge Preg-Pts) [id number] 08:13AM Patti Garza Order Number: [id number]     Test Name Result Flag Reference   GLUCOSE, 1 Hr  mg/dL  See Comment   <=134 Normal  135-179 Impaired glucose fasting   Perform 3 Hour Glucose Tolerance  >=180 Diagnosis Gestational Diabetes Mellitus

## 2018-01-18 NOTE — PROGRESS NOTES
2016         RE: Sushma Mhaendra                        To: [facility]  MR#: [id number]                                     [address]   : 1985                                   [id number]   ENC: [id number]:ROBBY Monet, [address]   (Exam #: [id number])                           Fax: [phone]      The LMP of this 27year old,  G1, P0-0-0-0 patient was MAY 8 2016, her   working MICK is MAR 16 2017 and the current gestational age is 25 weeks 5   days by 1st Trimester Sono  A sonographic examination was performed on 2016 using real time equipment  The ultrasound examination was performed   using abdominal & vaginal techniques  The patient has a BMI of 29 9  Her   blood pressure today was 113/74  Earliest ultrasound found in her record: 8/3/16  7w6d  3/16/17 MICK      Problem list:   1  History of a LEEP completed in May 2006      Cardiac motion was observed at 145 bpm       INDICATIONS      fetal anatomical survey   prior LEEP      Exam Types      LEVEL II   Transvaginal      RESULTS      Fetus # 1 of 1   Breech presentation   Fetal growth appeared normal   Placenta Location = Anterior   No placenta previa   Placenta Grade = I      MEASUREMENTS (* Included In Average GA)      AC              16 5 cm        21 weeks 1 day * (60%)   BPD              4 9 cm        20 weeks 6 days* (54%)   HC              18 2 cm        20 weeks 4 days* (42%)   Femur            3 3 cm        20 weeks 4 days* (39%)      Nuchal Fold      5 2 mm      Humerus          3 4 cm        21 weeks 5 days  (70%)      Cerebellum       2 3 cm        22 weeks 0 days   Biorbit          3 1 cm        19 weeks 6 days   CisternaMagna    4 2 mm      HC/AC           1 11   FL/AC           0 20   FL/BPD          0 67   EFW (Ac/Fl/Hc)   384 grams - 0 lbs 14 oz      THE AVERAGE GESTATIONAL AGE is 20 weeks 6 days +/- 10 days        AMNIOTIC FLUID         Largest Vertical Pocket = 5 4 cm   Amniotic Fluid: Normal      CERVICAL EVALUATION      The cervix appeared normal (Ultrasound Examination)  SUPINE      Cervical Length: 3 30 cm      OTHER TEST RESULTS           Funneling?: No             Dynamic Changes?: No        Resp  To TFP?: No      ANATOMY      Head                                    Normal   Face/Neck                               Normal   Th  Cav  Normal   Heart                                   Normal   Abd  Cav  Normal   Stomach                                 Normal   Right Kidney                            Normal   Left Kidney                             Normal   Bladder                                 Normal   Abd  Wall                               Normal   Spine                                   See Details   Extrems                                 Normal   Genitalia                               Normal   Placenta                                Normal   Umbl  Cord                              Normal   Uterus                                  Normal   PCI                                     Normal      ANATOMY DETAILS      Visualized Appearing Sonographically Normal:   HEAD: (Calvarium, BPD Level, Cavum, Lateral Ventricles, Choroid Plexus,   Cerebellum, Cisterna Magna);    FACE/NECK: (Neck, Nuchal Fold, Profile,   Orbits, Nose/Lips, Palate, Face);    TH  CAV  : (Lungs, Diaphragm); HEART: (Four Chamber View, Proximal Left Outflow, Proximal Right Outflow,   3VV, 3 Vessel Trachea, Short Axis of Greater Vessels, Ductal Arch, Aortic   Arch, Interventricular Septum, Interatrial Septum, IVC, SVC, Cardiac Axis,   Cardiac Position);    ABD  CAV : (Liver);    STOMACH, RIGHT KIDNEY, LEFT   KIDNEY, BLADDER, ABD   WALL, SPINE: (Cervical Spine, Sacrum);    EXTREMS:   (Lt Humerus, Rt Humerus, Lt Forearm, Rt Forearm, Lt Hand, Rt Hand, Lt   Femur, Rt Femur, Lt Low Leg, Rt Low Leg, Lt Foot, Rt Foot);    GENITALIA (Male), PLACENTA, UMBL  CORD, UTERUS, PCI      Suboptimally Visualized:   SPINE: (Thoracic Spine, Lumbar Spine)      ANATOMY COMMENTS      No fetal structural abnormality or ultrasound marker for aneuploidy is   identified on the Level II ultrasound study today  The views of the spine   are limited by fetal position  Fetal growth and amniotic fluid volume   appear normal   Active movement of the fetal body & extremities was seen  There is no suspicion of a subchorionic bleed  The placental cord   insertion was normal       ADNEXA      The left ovary appeared normal and measured 4 1 x 3 1 x 2 6 cm with a   volume of 17 3 cc  The right ovary was not visualized  IMPRESSION      Prieto IUP   20 weeks and 6 days by this ultrasound  (MICK=MAR 15 2017)   20 weeks and 5 days by 1st Tri Sono  (MICK=MAR 16 2017)   Breech presentation   Fetal growth appeared normal   Regular fetal heart rate of 145 bpm   Anterior placenta   No placenta previa      GENERAL COMMENT      The patient was informed of the findings and counseled about the   limitations of the exam in detecting all forms of fetal congenital   abnormalities  She denies any vaginal bleeding or uterine cramping/contractions  She does   feel fetal movement  Her sequential screen returned as normal with a one   in 10,000 risk for Maximino Martini and trisomy 25 and a one and 3700 risk for neural   tube defects  Exam shows she is comfortable and her abdomen is non tender  Follow up recommended: Recommend a follow up ultrasound in the next 2-4   weeks for spine views only  JACQUELINE Marquez , R LAURA Villalobos     Maternal-Fetal Medicine   Electronically signed 11/01/16 17:52

## 2018-01-22 VITALS
DIASTOLIC BLOOD PRESSURE: 68 MMHG | BODY MASS INDEX: 37.73 KG/M2 | WEIGHT: 221 LBS | SYSTOLIC BLOOD PRESSURE: 112 MMHG | HEIGHT: 64 IN

## 2018-01-22 VITALS
HEIGHT: 64 IN | WEIGHT: 221 LBS | BODY MASS INDEX: 37.73 KG/M2 | DIASTOLIC BLOOD PRESSURE: 60 MMHG | SYSTOLIC BLOOD PRESSURE: 98 MMHG

## 2018-01-22 VITALS
DIASTOLIC BLOOD PRESSURE: 62 MMHG | SYSTOLIC BLOOD PRESSURE: 98 MMHG | WEIGHT: 200 LBS | BODY MASS INDEX: 34.15 KG/M2 | HEIGHT: 64 IN

## 2018-01-22 VITALS
SYSTOLIC BLOOD PRESSURE: 110 MMHG | DIASTOLIC BLOOD PRESSURE: 60 MMHG | HEIGHT: 64 IN | BODY MASS INDEX: 34.66 KG/M2 | WEIGHT: 203 LBS

## 2018-01-22 VITALS
DIASTOLIC BLOOD PRESSURE: 62 MMHG | HEIGHT: 65 IN | BODY MASS INDEX: 35.99 KG/M2 | WEIGHT: 216 LBS | SYSTOLIC BLOOD PRESSURE: 112 MMHG

## 2018-03-07 NOTE — PROGRESS NOTES
Education  [employer] Education 3rd Trimester: Third Trimester Education provided:   benefits of breastfeeding, importance of exclusive breastfeeding, early initiation of breastfeeding, exclusive breastfeeding for the first 6 months, frequent feedings for optimal milk production, feeding on demand/baby-led feedings, effective positioning and attachment, importance of breastfeeding after 6 months following introduction of other foods, non-pharmacologic pain relief methods for labor, importance of early skin-to-skin contact and 28 week packet given  rooming-in on 24 hour basis Pregnancy Essentials Reference Guide given   The patient is planning on breastfeeding  The patient is planning on exclusively breastfeeding until the baby is 10months of age  Mother has registered for prenatal class  Thought has been given to selecting a pediatrician  Prenatal education provided by: Katia Zuniga MA      Signatures   Electronically signed by :  Marichuy Loya, ; [place]  3 2017  9:31AM EST                       (Co-author)    Electronically signed by : Ulysses Slipper, MD; [place]  3 2017 10:30AM EST

## 2020-10-12 ENCOUNTER — OFFICE VISIT (OUTPATIENT)
Dept: FAMILY MEDICINE CLINIC | Facility: CLINIC | Age: 35
End: 2020-10-12

## 2020-10-12 VITALS
BODY MASS INDEX: 33.94 KG/M2 | SYSTOLIC BLOOD PRESSURE: 115 MMHG | TEMPERATURE: 99.4 F | HEIGHT: 64 IN | OXYGEN SATURATION: 100 % | RESPIRATION RATE: 14 BRPM | WEIGHT: 198.8 LBS | HEART RATE: 66 BPM | DIASTOLIC BLOOD PRESSURE: 75 MMHG

## 2020-10-12 DIAGNOSIS — Z30.019 ENCOUNTER FOR INITIAL PRESCRIPTION OF CONTRACEPTIVES, UNSPECIFIED CONTRACEPTIVE: Primary | ICD-10-CM

## 2020-10-12 LAB — SL AMB POCT URINE HCG: NEGATIVE

## 2020-10-12 PROCEDURE — 81025 URINE PREGNANCY TEST: CPT | Performed by: FAMILY MEDICINE

## 2020-10-12 PROCEDURE — 99203 OFFICE O/P NEW LOW 30 MIN: CPT | Performed by: FAMILY MEDICINE

## 2020-10-12 RX ORDER — NORGESTIMATE AND ETHINYL ESTRADIOL 0.25-0.035
1 KIT ORAL DAILY
Qty: 28 TABLET | Refills: 2 | Status: SHIPPED | OUTPATIENT
Start: 2020-10-12 | End: 2020-12-31 | Stop reason: SDUPTHER

## 2020-12-31 ENCOUNTER — TELEPHONE (OUTPATIENT)
Dept: FAMILY MEDICINE CLINIC | Facility: CLINIC | Age: 35
End: 2020-12-31

## 2020-12-31 DIAGNOSIS — Z30.019 ENCOUNTER FOR INITIAL PRESCRIPTION OF CONTRACEPTIVES, UNSPECIFIED CONTRACEPTIVE: ICD-10-CM

## 2020-12-31 RX ORDER — NORGESTIMATE AND ETHINYL ESTRADIOL 0.25-0.035
1 KIT ORAL DAILY
Qty: 28 TABLET | Refills: 2 | Status: SHIPPED | OUTPATIENT
Start: 2020-12-31 | End: 2021-01-12 | Stop reason: SDUPTHER

## 2021-01-12 DIAGNOSIS — Z30.019 ENCOUNTER FOR INITIAL PRESCRIPTION OF CONTRACEPTIVES, UNSPECIFIED CONTRACEPTIVE: ICD-10-CM

## 2021-01-12 RX ORDER — NORGESTIMATE AND ETHINYL ESTRADIOL 0.25-0.035
1 KIT ORAL DAILY
Qty: 28 TABLET | Refills: 5 | Status: SHIPPED | OUTPATIENT
Start: 2021-01-12 | End: 2021-01-18 | Stop reason: SDUPTHER

## 2021-01-18 DIAGNOSIS — Z30.019 ENCOUNTER FOR INITIAL PRESCRIPTION OF CONTRACEPTIVES, UNSPECIFIED CONTRACEPTIVE: ICD-10-CM

## 2021-01-18 RX ORDER — NORGESTIMATE AND ETHINYL ESTRADIOL 0.25-0.035
1 KIT ORAL DAILY
Qty: 28 TABLET | Refills: 5 | Status: SHIPPED | OUTPATIENT
Start: 2021-01-18 | End: 2021-07-18

## 2021-07-18 DIAGNOSIS — Z30.019 ENCOUNTER FOR INITIAL PRESCRIPTION OF CONTRACEPTIVES, UNSPECIFIED CONTRACEPTIVE: ICD-10-CM

## 2021-07-18 RX ORDER — NORGESTIMATE AND ETHINYL ESTRADIOL 0.25-0.035
KIT ORAL
Qty: 84 TABLET | Refills: 1 | Status: SHIPPED | OUTPATIENT
Start: 2021-07-18 | End: 2022-04-27 | Stop reason: ALTCHOICE

## 2022-04-27 ENCOUNTER — OFFICE VISIT (OUTPATIENT)
Dept: FAMILY MEDICINE CLINIC | Facility: CLINIC | Age: 37
End: 2022-04-27

## 2022-04-27 VITALS
HEIGHT: 64 IN | SYSTOLIC BLOOD PRESSURE: 120 MMHG | HEART RATE: 70 BPM | OXYGEN SATURATION: 99 % | WEIGHT: 215 LBS | DIASTOLIC BLOOD PRESSURE: 78 MMHG | BODY MASS INDEX: 36.7 KG/M2

## 2022-04-27 DIAGNOSIS — N63.21 MASS OF UPPER OUTER QUADRANT OF LEFT BREAST: Primary | ICD-10-CM

## 2022-04-27 PROCEDURE — 99214 OFFICE O/P EST MOD 30 MIN: CPT | Performed by: NURSE PRACTITIONER

## 2022-04-27 NOTE — PATIENT INSTRUCTIONS
Breast Mass   WHAT YOU NEED TO KNOW:   What do I need to know about a breast mass? A breast mass is a lump or growth in your breast or underarm  A cyst (fluid-filled pocket), an injury, or changes in your breast tissue are the most common causes  A breast mass can also be caused by cancer  You must follow up as directed to find the cause of your breast mass  How is a breast mass evaluated? Your healthcare provider will perform a breast exam to feel the mass  Tell him or her when you noticed the breast mass, and if it has changed in size  Tell him or her if you have any other symptoms, such as pain, fever, or nipple discharge  He or she will ask if you have a personal or family history of breast disease or cancer  He or she will also ask if you had an injury, biopsy, or surgery on your breast   · Aspiration  is a procedure used to withdraw fluid or cells from your breast mass to be tested for cancer  · A mammogram  is an x-ray to examine your breast mass  Healthcare providers will also look for other lumps or tissue changes in your breast          · An ultrasound  uses sound waves to look for a cyst or tumor  Why is it important to continue breast self-exams? Check your breast for changes in size, shape, or feel of the breast tissue  Check under your arms and all around your breasts  If you have monthly periods, examine your breasts after your period is over  Contact your healthcare provider if you notice any changes in your breasts  If you have questions, ask for more information on how to do a breast self-exam        When should I call my doctor? · Your breast mass returns or grows larger  · You have pain in your breast or underarm  · You have nipple discharge  · You notice other changes to your breasts or nipples, such as dimpling or a rash  · You had an aspiration and you have redness, pain, swelling, or warmth near the aspiration site  · You have a fever      · You have questions or concerns about your condition or care  CARE AGREEMENT:   You have the right to help plan your care  Learn about your health condition and how it may be treated  Discuss treatment options with your healthcare providers to decide what care you want to receive  You always have the right to refuse treatment  The above information is an  only  It is not intended as medical advice for individual conditions or treatments  Talk to your doctor, nurse or pharmacist before following any medical regimen to see if it is safe and effective for you  © Copyright [employer] 2022 Information is for End User's use only and may not be sold, redistributed or otherwise used for commercial purposes   All illustrations and images included in CareNotes® are the copyrighted property of A D A M , Inc  or [address] [place], [place] [address]rod

## 2022-04-27 NOTE — PROGRESS NOTES
Assessment/Plan:    No problem-specific Assessment & Plan notes found for this encounter  Diagnoses and all orders for this visit:    Mass of upper outer quadrant of left breast  Comments:  Diagnostic mammo as ordered  Advised warm compress, monitor for fever, nipple discharge, redness, skin changes to breast   Orders:  -     Mammo diagnostic left w cad; Future        BMI Counseling: Body mass index is 36 9 kg/m²  The BMI is above normal  Nutrition recommendations include reducing portion sizes, decreasing overall calorie intake, 3-5 servings of fruits/vegetables daily, consuming healthier snacks, moderation in carbohydrate intake, increasing intake of lean protein, reducing intake of saturated fat and trans fat and reducing intake of cholesterol  Exercise recommendations include moderate aerobic physical activity for 150 minutes/week and strength training exercises  Subjective:      Patient ID: Wayne Amaro is a 44 y o  female  Patient presents to office for evaluation of mass to left breast   Observed it a day ago  States she was changing positions when observed pain in her left breast   Upon further examination of breast, observed mass  Patient notes tenderness to area  Denies fever, chills, unexplained weight loss, enlarged lymph nodes  Denies nipple discharge, color changes to breast, redness, swelling  Patient has history of breast cancer in maternal grandmother, diagnosed at age 52  The following portions of the patient's history were reviewed and updated as appropriate: allergies, current medications, past family history, past medical history, past social history, past surgical history and problem list     Review of Systems   Constitutional: Negative for chills, fatigue, fever and unexpected weight change  Respiratory: Negative for cough and shortness of breath  Cardiovascular: Negative for chest pain, palpitations and leg swelling     Gastrointestinal: Negative for abdominal pain, nausea and vomiting  Genitourinary: Negative for decreased urine volume, dysuria, frequency, hematuria, pelvic pain, urgency, vaginal bleeding and vaginal discharge  Musculoskeletal: Negative for neck pain and neck stiffness  Skin: Negative for color change, rash and wound  Neurological: Negative for dizziness, weakness, light-headedness, numbness and headaches  Hematological: Negative for adenopathy  Psychiatric/Behavioral: Negative for confusion  Objective:      /78   Pulse 70   Ht 5' 4" (1 626 m)   Wt 97 5 kg (215 lb)   LMP 04/11/2022 (Exact Date)   SpO2 99%   BMI 36 90 kg/m²          Physical Exam  Vitals reviewed  Constitutional:       General: She is not in acute distress  Appearance: Normal appearance  She is not ill-appearing  HENT:      Head: Normocephalic and atraumatic  Right Ear: External ear normal       Left Ear: External ear normal       Nose: Nose normal       Mouth/Throat:      Mouth: Mucous membranes are moist       Pharynx: Oropharynx is clear  Eyes:      Conjunctiva/sclera: Conjunctivae normal       Pupils: Pupils are equal, round, and reactive to light  Cardiovascular:      Rate and Rhythm: Normal rate and regular rhythm  Pulses: Normal pulses  Heart sounds: Normal heart sounds  No murmur heard  Pulmonary:      Effort: Pulmonary effort is normal  No respiratory distress  Breath sounds: Normal breath sounds  No wheezing  Chest:   Breasts: Breasts are symmetrical       Right: Normal  No axillary adenopathy or supraclavicular adenopathy  Left: Mass and tenderness present  No swelling, bleeding, inverted nipple, nipple discharge, skin change, axillary adenopathy or supraclavicular adenopathy  Abdominal:      General: Bowel sounds are normal       Palpations: Abdomen is soft  Tenderness: There is no abdominal tenderness  Musculoskeletal:         General: Normal range of motion        Cervical back: Normal range of motion and neck supple  Right lower leg: No edema  Left lower leg: No edema  Lymphadenopathy:      Cervical: No cervical adenopathy  Upper Body:      Right upper body: No supraclavicular, axillary or pectoral adenopathy  Left upper body: No supraclavicular, axillary or pectoral adenopathy  Skin:     General: Skin is warm and dry  Capillary Refill: Capillary refill takes less than 2 seconds  Neurological:      General: No focal deficit present  Mental Status: She is alert and oriented to person, place, and time     Psychiatric:         Mood and Affect: Mood normal          Behavior: Behavior normal

## 2022-04-28 ENCOUNTER — HOSPITAL ENCOUNTER (OUTPATIENT)
Dept: ULTRASOUND IMAGING | Facility: HOSPITAL | Age: 37
Discharge: HOME | End: 2022-04-28

## 2022-04-28 ENCOUNTER — HOSPITAL ENCOUNTER (OUTPATIENT)
Dept: MAMMOGRAPHY | Facility: HOSPITAL | Age: 37
Discharge: HOME | End: 2022-04-28

## 2022-04-28 VITALS — WEIGHT: 215 LBS | BODY MASS INDEX: 36.7 KG/M2 | HEIGHT: 64 IN

## 2022-04-28 DIAGNOSIS — N63.21 MASS OF UPPER OUTER QUADRANT OF LEFT BREAST: ICD-10-CM

## 2022-04-28 DIAGNOSIS — N63.20 MASS OF LEFT BREAST, UNSPECIFIED QUADRANT: ICD-10-CM

## 2022-04-28 PROCEDURE — 76642 ULTRASOUND BREAST LIMITED: CPT

## 2022-04-28 PROCEDURE — G0279 TOMOSYNTHESIS, MAMMO: HCPCS

## 2022-04-28 PROCEDURE — 77066 DX MAMMO INCL CAD BI: CPT

## 2022-05-02 ENCOUNTER — HOSPITAL ENCOUNTER (OUTPATIENT)
Dept: MAMMOGRAPHY | Facility: CLINIC | Age: 37
Discharge: HOME | End: 2022-05-02
Payer: COMMERCIAL

## 2022-05-02 ENCOUNTER — HOSPITAL ENCOUNTER (OUTPATIENT)
Dept: ULTRASOUND IMAGING | Facility: CLINIC | Age: 37
Discharge: HOME | End: 2022-05-02
Payer: COMMERCIAL

## 2022-05-02 VITALS — DIASTOLIC BLOOD PRESSURE: 70 MMHG | HEART RATE: 84 BPM | SYSTOLIC BLOOD PRESSURE: 112 MMHG

## 2022-05-02 DIAGNOSIS — R92.8 ABNORMAL MAMMOGRAM: ICD-10-CM

## 2022-05-02 PROCEDURE — A4648 IMPLANTABLE TISSUE MARKER: HCPCS

## 2022-05-02 PROCEDURE — 76942 ECHO GUIDE FOR BIOPSY: CPT

## 2022-05-02 PROCEDURE — 88305 TISSUE EXAM BY PATHOLOGIST: CPT | Performed by: SPECIALIST

## 2022-05-02 PROCEDURE — 88342 IMHCHEM/IMCYTCHM 1ST ANTB: CPT | Performed by: SPECIALIST

## 2022-05-02 PROCEDURE — 88341 IMHCHEM/IMCYTCHM EA ADD ANTB: CPT | Performed by: SPECIALIST

## 2022-05-02 PROCEDURE — 88361 TUMOR IMMUNOHISTOCHEM/COMPUT: CPT | Performed by: SPECIALIST

## 2022-05-02 PROCEDURE — 19083 BX BREAST 1ST LESION US IMAG: CPT

## 2022-05-02 PROCEDURE — 19084 BX BREAST ADD LESION US IMAG: CPT

## 2022-05-02 RX ORDER — LIDOCAINE HYDROCHLORIDE 10 MG/ML
1 INJECTION, SOLUTION EPIDURAL; INFILTRATION; INTRACAUDAL; PERINEURAL ONCE
Status: COMPLETED | OUTPATIENT
Start: 2022-05-02 | End: 2022-05-02

## 2022-05-02 RX ORDER — LIDOCAINE HYDROCHLORIDE 10 MG/ML
5 INJECTION, SOLUTION EPIDURAL; INFILTRATION; INTRACAUDAL; PERINEURAL ONCE
Status: COMPLETED | OUTPATIENT
Start: 2022-05-02 | End: 2022-05-02

## 2022-05-02 RX ADMIN — LIDOCAINE HYDROCHLORIDE 1 ML: 10 INJECTION, SOLUTION EPIDURAL; INFILTRATION; INTRACAUDAL; PERINEURAL at 14:42

## 2022-05-02 RX ADMIN — LIDOCAINE HYDROCHLORIDE 5 ML: 10 INJECTION, SOLUTION EPIDURAL; INFILTRATION; INTRACAUDAL; PERINEURAL at 14:32

## 2022-05-02 RX ADMIN — LIDOCAINE HYDROCHLORIDE 5 ML: 10 INJECTION, SOLUTION EPIDURAL; INFILTRATION; INTRACAUDAL; PERINEURAL at 14:36

## 2022-05-02 RX ADMIN — LIDOCAINE HYDROCHLORIDE 1 ML: 10 INJECTION, SOLUTION EPIDURAL; INFILTRATION; INTRACAUDAL; PERINEURAL at 14:48

## 2022-05-02 RX ADMIN — LIDOCAINE HYDROCHLORIDE 1 ML: 10 INJECTION, SOLUTION EPIDURAL; INFILTRATION; INTRACAUDAL; PERINEURAL at 14:43

## 2022-05-02 NOTE — PROGRESS NOTES
Procedure type:    ___X__ultrasound guided _____stereotactic    Breast:    ____X_Left _____Right    Location: Left axilla    Needle: 16ga Nandini    # of passes: 2    Clip: Butterfly(Hydromark)    Performed by: Dr Dereck Pagan held for 5 minutes by:  Gee ZhangPCA)    Steri Strips:    ___X__yes _____no    Band aid:    ____X_yes_____no    Tape and guaze:    _____yes ___X__no    Tolerated procedure:    ___X__yes _____no

## 2022-05-02 NOTE — PROGRESS NOTES
Ice pack given:    _X____yes _____no    Discharge instructions signed by patient:    ___X__yes _____no    Discharge instructions given to patient:    ____X_yes _____no    Discharged via:    ___X__ambulatory    _____wheelchair    _____stretcher    Stable on discharge:    ____X_yes ____no

## 2022-05-02 NOTE — PROGRESS NOTES
Procedure type:    ___X__ultrasound guided _____stereotactic    Breast:    ____X_Left _____Right    Location: Left breast 1 o'clock 8cm Fn    Needle: 12ga Nandini    # of passes: 1    Clip: Wing (Ultraclip)    Performed by: Dr Stella Swain held for 5 minutes by:  Brad Linares(PCA)    Steri Strips:    ____X_yes _____no    Band aid:    ___X__yes_____no    Tape and guaze:    _____yes __X___no    Tolerated procedure:    ___X__yes _____no

## 2022-05-02 NOTE — DISCHARGE INSTR - OTHER ORDERS
POST LARGE CORE BREAST BIOPSY PATIENT INFORMATION      Place an ice pack inside your bra over the top of the dressing every hour for 20 minutes (20 minutes on, 60 minutes off)  Do this until bedtime  Do not shower or bathe until the following morning  After bathing, you may remove the bandaid  You may bathe your breast carefully with the steri-strips in place  Be careful not to loosen them  The steri-strips will fall off in 3-5 days  You may have mild discomfort, and you may have some bruising where the needle entered the skin  This should clear within 5-7 days  If you need medicine for discomfort, take acetaminophen products such as Tylenol  You may also take Advil or Motrin products  DO NOT use Aspirin for the first 24 hours  Do not participate in strenuous activities such as-tennis, aerobics, weight lifting, etc  for 24 hours  Refrain from swimming/soaking for 72 hours  Wearing a bra for sleeping may be more comfortable for the first 24-48 hours after your biopsy  Watch for continued bleeding, pain or fever over 101  If any of these symptoms occur, please contact our breast nurse navigator at the location where your biopsy was performed  During normal business hours (7:30am - 4:00pm) please call the nurse navigator at the site     where your procedure was performed:       [facility] [address]: [phone] or [phone] [address] [facility] [place] [place]: [phone] or [phone]     [address]: [address] [facility]: [address]: [phone]        After 4pm - please call your physician or go to the nearest Emergency Department location  The final results of your biopsy are usually available within one week

## 2022-05-02 NOTE — PROGRESS NOTES
Procedure type:    ____X_ultrasound guided _____stereotactic    Breast:    ___X__Left _____Right    Location: Left breast 1 o'clock 7 cmFn    Needle: 12ga Nandini    # of passes: 2    Clip: 16ga 7 5cm Daily  reflector     Performed by:Dr Tanya Kaur held for 5 minutes by:  Dianne ZhangPCA)    Steri Strips:    ___X__yes _____no    Band aid:    __X___yes_____no    Tape and guaze:    _____yes __X___no    Tolerated procedure:    ___X__yes _____no

## 2022-05-03 NOTE — PROGRESS NOTES
Post procedure call completed 5/3/22 at 1136    Bleeding: _____yes __X___no    Pain: _____yes ___X___no (soreness)    Redness/Swelling: ______yes ___X___no    Band aid removed: __X___yes _____no    Steri-Strips intact: ___X___yes _____no (discussed with patient to remove steri strips on day 5 if they have not come off on their own)    Pt with no questions at this time, adv will call when results available, adv to call with any questions or concerns, has name/# for contact

## 2022-05-04 NOTE — PROGRESS NOTES
Post procedure call completed    Bleeding: _____yes __x___no    Pain: __x___yes ______no  Decreased today," not needing medication"    Redness/Swelling: ______yes _x_____no      bruising    Band aid removed: __x___yes _____no    Steri-Strips intact: ___x___yes _____no

## 2022-05-05 ENCOUNTER — TELEPHONE (OUTPATIENT)
Dept: MAMMOGRAPHY | Facility: CLINIC | Age: 37
End: 2022-05-05

## 2022-05-06 ENCOUNTER — TELEPHONE (OUTPATIENT)
Dept: HEMATOLOGY ONCOLOGY | Facility: CLINIC | Age: 37
End: 2022-05-06

## 2022-05-06 ENCOUNTER — TELEPHONE (OUTPATIENT)
Dept: MAMMOGRAPHY | Facility: CLINIC | Age: 37
End: 2022-05-06

## 2022-05-06 ENCOUNTER — TELEPHONE (OUTPATIENT)
Dept: GENETICS | Facility: CLINIC | Age: 37
End: 2022-05-06

## 2022-05-06 ENCOUNTER — DOCUMENTATION (OUTPATIENT)
Dept: HEMATOLOGY ONCOLOGY | Facility: CLINIC | Age: 37
End: 2022-05-06

## 2022-05-06 NOTE — TELEPHONE ENCOUNTER
[address]  Newly Diagnosed Genetics Checklist    Please route all intake forms to 'oncology genetics breast' pool in [facility]  This includes patients that decline testing  Patient is 48 or under at the time of diagnosis discuss genetics (script below)    Script for Genetics:  Approximately 5-10% of cancer can have an underlying genetic cause  Genetic testing is recommended for women diagnosed with breast cancer at age 48 or under  Your breast surgeon recommends that you have genetic testing to help determine your surgical plan  Our genetics team will call you in 24-48 hours to discuss this test and schedule a blood draw  The Genetics team will be able to address your questions  Outcome:    Patient is 48 or under: YES    Referral Outcome: YES    Rajani's mother had genetic testing several years ago and she stated it was "negative"  Ramses Alfaro is self pay  She has no insurance at this time

## 2022-05-06 NOTE — TELEPHONE ENCOUNTER
Ibrahima Donald  I was trying to put the order in and had some questions  I tried to contact you via [employer]  If you can call our office @ [phone], it would be greatly appreciated    Thank you

## 2022-05-06 NOTE — PROGRESS NOTES
Intake received- chart reviewed    Outside records needed? No, work up done by [facility]     Pathology complete? Yes, completed on 5-2-2022    Imaging complete? Yes, Ultrasound and Mammogram completed on 5-2-2022

## 2022-05-06 NOTE — TELEPHONE ENCOUNTER
Intake received  Pt has no ins  Called pt to find out about the ins  Also recvd an email from Πάνου 90 at the [facility] letting me k ow that this pt has no ins & if I could reach out  to her       Called pt & she sd that she has no ins & right now she doesn't plan in getting ins  we talked about kellie & I gave her that #    Also gave pt the # for medicaid to apply   Gave her the inf for BCCPT program & she sd that she's going to call all the#s & apply for what she can apply for  & she will get back to me next week to give me an update  Pt thanked me for the call & the info  Emailed this info to mann at the [facility]
Intake received- chart reviewed    Outside records needed? No, work up done by [facility]     Pathology complete? Yes, completed on 5-2-2022    Imaging complete? Yes, Ultrasound and Mammogram completed on 5-2-2022
Patient assistance review in progress
Soft Intake Form   Patient Details   Margi Gage     1985     Reason For Appointment   Who is Calling? Patient   If not patient, Name?  n/a   DID YOU CONFIRM INSURANCE WITH PATIENT? Yes, Patient is self pay    Who is the Referring Doctor? [facility]    What is the diagnosis? LEFT BREAST INVASIVE MAMMARY CARCINOMA-1 SITE                     LEFT BREAST ADH-1 SITE   Has this diagnosis been confirmed by a biopsy/surgery? If yes, what is the date it was done? Yes, 5/2/22   Biopsy done at [facility]? If not, where was it done? yes   Was imaging done, and was it done at [address] [place] [address]? If not, where was it done? Yes, [facility]   Have you been seen by another Oncologist?  If so, who and where (name of facility, city and state) no   For 2nd Opinions Only: Are you currently undergoing treatment, or are you scheduled to start treatment? If yes, name of facility, city and state  n/a   For "History Of" only: Have you completed treatment? n/a    Have you had Genetic Testing done in the past?  If so, advise to bring test results to their visit no   Record Gathering Information   Did you advise to have records faxed to [phone]? no   Did you advise to have disks sent to the proper address with imaging? ("History of" Patients)  5 years of imaging for breast patients-Mammos, US etc no   Scheduling Information   Did you send new patient paperwork? Email or mail? Yes, E-Mail   What is the best call back number? (If the [facility] is calling, please use their number) [phone]   Miscellaneous Information    Schedule  d appointment Dr Ellyn Kocher 5/16/22 9:30 am [address]  dditional information: Jamar Arredondo does not have insurance; she has been self pay; please Gabby Solorzano  reach out to her  She is also recommended to have Breast MRI-  requesting order from her referring CRNP so that can be scheduled ASAP
78

## 2022-05-06 NOTE — TELEPHONE ENCOUNTER
I introduced myself to [place] [facility] and let her know that her nurse navigator reached out to the cancer risk and genetics program on her behalf  I reviewed the following with Rajani:     While the majority of cancer occurs by chance, approximately 5-10% of breast cancer has an underlying genetic cause  Genetic testing is available which can determine if there is an underlying genetic cause to your cancer  Understanding if there is an underlying genetic cause can:  o Provide your surgeon with additional information to help with surgical decisions, treatment decisions and eligibility for clinical trials  o It can determine if you have an increased risk for any additional cancers  o Help family members understand their cancer risk   We work closely with [facility] surgeons and are reaching out to see if you have interest in genetic testing  The reason we are reaching out at this time is that this result may help your surgeon determine the appropriate type of surgery (i e  lumpectomy vs mastectomy)  This test is not a requirement but can take 5-10 days to complete so we would like to start the process as soon as possible so the results are ready for your appointment with your surgeon   If you are interested in genetic testing, I can collect your family history and initiate genetic testing for you     Patient elected to pursue testing      Diagnosis Details:  o Invasive mammary carcinoma   o Left  o Hormone receptors pending   Personal History:  o Do you have a personal history of any other cancer? No  - If yes type/age of diagnosis:    Family history:   o Do you have Ashkenazi Religion ancestry? No  - If yes, maternal, paternal, or both?  o Do you have any children? Yes  - How many sons? 1  - How many daughters? 0  - Do any of your children have a history of cancer? No  - If yes type/age of diagnosis:     o Do you have any brothers or sisters? Yes  - How many brothers?  1  - How many sisters? 0  - Are they from the same parents? No  - If no how maternal/paternal half-siblings: maternal half brother  - Do any of your brothers or sisters have a history of cancer? No  - If yes who and the type/age of diagnosis:           Do you have nieces or nephews? Yes  - Do any of them have a history of cancer? No  - If yes type/age of diagnosis:    o Does your mother have a history of cancer? No  - If yes, cancer type and age of diagnosis: Mom had genetic testing in   According to the patient's mother Cecelia Roth), she was negative for the Hereditary Common Cancer panel through [facility]  - Is your mother still living? Yes  - Age/Age of death: 61    o Thinking about your mother's family (aunts, uncles, cousins, grandparents) is there anyone with a history of cancer? Yes  - If yes, list relationship, cancer type and age of diagnosis:  MGM Breast ca age 49()  Maternal Great Uncle Lung ca age unknown    o Does your father have a history of cancer? Unknown    o Thinking about your father's family (aunts, uncles, cousins, grandparents) is there anyone with a history of cancer? Unknown  - If yes, list relationship, cancer type and age of diagnosis:       Types of Results- positive, negative, VUS  o Positive result- may explain personal diagnosis/family history  Can give surgeon information on treatment plan, inform future screening/management or tell a person about other possible risks  Positive results can initiate testing for other family members who may be at risk (children, siblings, etc)  o Negative result- does not give an explanation  Surgical/treatment plan will be based on clinical presentation and will be part of discussion with surgeon  Negative result cannot be passed down to children, but they are still at elevated risk  o Uncertain result- common, but treated like a negative result clinically  90% are downgraded over time       [facility]'s billing policy   o Most insurance plans cover the cost of genetic testing  The out-of-pocket cost varies due to the differences in deductibles, co-payments and co-insurance defined by individual plans but 90% of people pay $100 or less for a genetic test  Patient is uninsured, she will e-mail her most recent NATAN along with mom's genetic test report  A blood kit will be mailed to you overnight  Please take the blood kit along with packet of paperwork to any [facility]'s lab to have your blood drawn  We have genetic counselors available, if you have any additional questions or would like to speak with them we can schedule you a 15 minute phone appointment  Plan:  A blood kit was mailed out on 5/6/2022 along with information on genetic testing and the lab's billing policy  Genetic Testing Preformed: [facility] BRCAplus STAT Panel (8 genes): KIRSTIN, BRCA1, BRCA2, CDH1, CHEK2, PALB2, PTEN, TP53 with reflex to [facility] CancerNext (28 additional genes): APC, KIRSTIN, AXIN2 BARD1, BRCA1, BRCA2, BRIP1, BMPR1A, CDH1, CDK4, CDKN2A, CHEK2, DICER1, EPCAM, GREM1, HOXB13, MLH1, MSH2, MSH3, MSH6, MUTYH, NBN, NF1, NTHL1, PALB2, PMS2, POLD1, POLE, PTEN, RAD51C, RAD51D, RECQL SMAD4, SMARCA4, STK11, TP53    Initial results will take approximately 5-12 days to return     Additional results may take up to 2-3 weeks to complete once test is started  Patient does not have any further questions, declined meeting with genetic counselor    When your results are ready, someone from the genetics team will call you, review the results, and contact your breast surgeon  You will be contacted with any type of result- positive, negative, or uncertain

## 2022-05-06 NOTE — TELEPHONE ENCOUNTER
[address] [facility] Surgical Oncology Referral    Diagnosis:  LEFT BREAST INVASIVE MAMMARY CARCINOMA-1 SITE                     LEFT BREAST ADH-1 SITE    Is this diagnosis cancer (Y/N): YES    Biopsy Date: 5/2/22    Does the patient have another biopsy pending: NO  If so, when:    Preferred provider: Dr Fidel Moss or Dr Medhat Sierra- whoever has soonest appt    Preferred location: soonest location    Any requests for dates/times: ASAP    Any additional information: Anushka Lennon does not have insurance; she has been self pay; please ask Jose J Arnold to reach out to her  She is also recommended to have Breast MRI- I am requesting order from her referring CRNP so that can be scheduled ASAP    Please advise when appointment is made Thank You

## 2022-05-09 ENCOUNTER — PATIENT OUTREACH (OUTPATIENT)
Dept: HEMATOLOGY ONCOLOGY | Facility: CLINIC | Age: 37
End: 2022-05-09

## 2022-05-09 NOTE — PROGRESS NOTES
Breast Oncology Nurse Navigator    Called patient for initial outreach from nurse navigator  Left voicemail message with contact information  Requested a call back

## 2022-05-10 ENCOUNTER — TELEPHONE (OUTPATIENT)
Dept: GENETICS | Facility: CLINIC | Age: 37
End: 2022-05-10

## 2022-05-10 NOTE — TELEPHONE ENCOUNTER
I called as a reminder for the patient to have her labs drawn for genetic testing  Patient states she will go in the next two days to have this completed

## 2022-05-11 ENCOUNTER — HOSPITAL ENCOUNTER (OUTPATIENT)
Dept: MRI IMAGING | Facility: HOSPITAL | Age: 37
Discharge: HOME | End: 2022-05-11
Payer: COMMERCIAL

## 2022-05-11 ENCOUNTER — TELEPHONE (OUTPATIENT)
Dept: MAMMOGRAPHY | Facility: CLINIC | Age: 37
End: 2022-05-11

## 2022-05-11 VITALS — WEIGHT: 210 LBS | BODY MASS INDEX: 35.85 KG/M2 | HEIGHT: 64 IN

## 2022-05-11 DIAGNOSIS — C50.919 INVASIVE CARCINOMA OF BREAST (HCC): ICD-10-CM

## 2022-05-11 PROCEDURE — A9585 GADOBUTROL INJECTION: HCPCS | Performed by: NURSE PRACTITIONER

## 2022-05-11 PROCEDURE — G1004 CDSM NDSC: HCPCS

## 2022-05-11 PROCEDURE — C8908 MRI W/O FOL W/CONT, BREAST,: HCPCS

## 2022-05-11 PROCEDURE — C8937 CAD BREAST MRI: HCPCS

## 2022-05-11 RX ADMIN — GADOBUTROL 9 ML: 604.72 INJECTION INTRAVENOUS at 08:57

## 2022-05-11 NOTE — TELEPHONE ENCOUNTER
[employer] called because she has pain at area of original lump in her left breast, since this morning  She stated there are no skin changes  She had her breast MRI at 0730  We discussed that discomfort may be from breasts hanging for positioning  She agreed to use warm compress and to call [facility] tomorrow with update  I offered that we can see her today or tomorrow if she would like to come for site check

## 2022-05-13 PROBLEM — C50.412 MALIGNANT NEOPLASM OF UPPER-OUTER QUADRANT OF LEFT BREAST IN FEMALE, ESTROGEN RECEPTOR POSITIVE (HCC): Status: ACTIVE | Noted: 2022-05-13

## 2022-05-13 PROBLEM — Z17.0 MALIGNANT NEOPLASM OF UPPER-OUTER QUADRANT OF LEFT BREAST IN FEMALE, ESTROGEN RECEPTOR POSITIVE (HCC): Status: ACTIVE | Noted: 2022-05-13

## 2022-05-16 ENCOUNTER — CONSULT (OUTPATIENT)
Dept: SURGICAL ONCOLOGY | Facility: CLINIC | Age: 37
End: 2022-05-16
Payer: COMMERCIAL

## 2022-05-16 ENCOUNTER — APPOINTMENT (OUTPATIENT)
Dept: LAB | Facility: CLINIC | Age: 37
End: 2022-05-16
Payer: COMMERCIAL

## 2022-05-16 VITALS
OXYGEN SATURATION: 99 % | BODY MASS INDEX: 36.88 KG/M2 | HEIGHT: 64 IN | WEIGHT: 216 LBS | SYSTOLIC BLOOD PRESSURE: 120 MMHG | RESPIRATION RATE: 16 BRPM | TEMPERATURE: 97.3 F | HEART RATE: 100 BPM | DIASTOLIC BLOOD PRESSURE: 74 MMHG

## 2022-05-16 DIAGNOSIS — Z17.0 MALIGNANT NEOPLASM OF UPPER-OUTER QUADRANT OF LEFT BREAST IN FEMALE, ESTROGEN RECEPTOR POSITIVE (HCC): Primary | ICD-10-CM

## 2022-05-16 DIAGNOSIS — C50.412 MALIGNANT NEOPLASM OF UPPER-OUTER QUADRANT OF LEFT BREAST IN FEMALE, ESTROGEN RECEPTOR POSITIVE (HCC): Primary | ICD-10-CM

## 2022-05-16 DIAGNOSIS — Z80.3 FAMILY HISTORY OF BREAST CANCER: ICD-10-CM

## 2022-05-16 DIAGNOSIS — C50.919 INVASIVE CARCINOMA OF BREAST (HCC): ICD-10-CM

## 2022-05-16 DIAGNOSIS — Z90.10 POSTMASTECTOMY LYMPHANGIOSARCOMA SYNDROME, UNSPECIFIED LATERALITY (HCC): ICD-10-CM

## 2022-05-16 DIAGNOSIS — Z80.3 FAMILY HISTORY OF MALIGNANT NEOPLASM OF BREAST: ICD-10-CM

## 2022-05-16 DIAGNOSIS — C50.919 POSTMASTECTOMY LYMPHANGIOSARCOMA SYNDROME, UNSPECIFIED LATERALITY (HCC): ICD-10-CM

## 2022-05-16 PROCEDURE — 99214 OFFICE O/P EST MOD 30 MIN: CPT | Performed by: SURGERY

## 2022-05-16 PROCEDURE — 36415 COLL VENOUS BLD VENIPUNCTURE: CPT

## 2022-05-16 NOTE — PROGRESS NOTES
Surgical Oncology Consult Note       [address] [place] [address]  CANCER CARE [facility] SURGICAL ONCOLOGY [place]  [address] [facility] [address]  [facility] [id number]    Rajani Wall  1985  [id number]  [address] [facility]  CANCER [facility] SURGICAL ONCOLOGY [place]  [address] [facility] [address]      Chief Complaint:     Chief Complaint   Patient presents with    Consult    Breast Cancer     New Diagnosis       Assessment and Plan:   Assessment/Plan   The patient presents with a new diagnosis of left breast invasive ductal carcinoma based on her mammogram and ultrasound it is a unifocal lesion measuring 1 7 cm grade 2   HER2 1+/negative  She had an axillary biopsy which was negative  She has a JOE  clip placed within the cancer  She has ADH adjacent to this area which would need to be removed, (possibly with an additional needle localization process)    However the patient subsequently had an MRI which demonstrated possibly multicentric/multifocal disease as well as possible adenopathy on the contralateral right side  Radiology recommended bilateral ultrasounds and possible biopsies  The patient is hoping to have another child  She understands anti hormonal therapy could potentially interrupt this as well as possible chemotherapy  I have recommended ordering a mammaprint to help delineate the role of chemotherapy for a 1 7 cm tumor  Will also coordinate genetic testing  They have reached out to her already however she has not initiated the test   We will get this coordinated today  I will see her back following her ultrasound and/or biopsy results  Oncology History:     Oncology History   Malignant neoplasm of upper-outer quadrant of left breast in female, estrogen receptor positive ([facility] [address] )   5/2/2022 Biopsy    Left breast ultrasound guided biopsy  A  Left Axillary inferior lymph node  No tumor seen in one lymph node    B   Left breast   1 o'clock, 7 cm from nipple  Invasive mammary carcinoma of no special type (ductal)  Grade 2      HER2 1+   Lymphovascular invasion not identified    C  Left breast  1 o'clock, 8 cm from nipple  Atypical Ductal hyperplasia    D  Right breast   1 o'clock, 7 cm from nipple  Fibroadenoma    All sites concordant with imaging  Left malignancy appears unifocal  Due to nodular parenchymal pattern, left greater than right, as well as the coexistent ADH, MRI is recommended to evaluate extent of disease  Daily  placed in malignant site  5/11/2022 Observation    Bilateral breast MRI: Prominent 8 mm lymph node seen in right axilla in posterior depth  Recommend second-look US of numerous left breast lesions as well as the right breast lymph node  Unlikely that calcifications located inferomedially to left breast biopsy clips will alter surgical management  US and potential biopsy recommended bilaterally  History of Present Illness: This is a 50-year-old woman who appreciated a mass in her left breast back in April  She subsequently sought medical attention and had diagnostic workup which demonstrated bilateral abnormalities  On the right side at the 1 o'clock position 7 cm from the nipple was a mass which was biopsied and shown to be a fibroadenoma  On the left side the patient had 2 biopsies 1 was at the 1 o'clock position 7 cm from the nipple which was a invasive ductal carcinoma measuring 1 7 cm in size it was felt to be unifocal lesion initially, see below  This tumor was grade 2 % % HER2 Ramila 1+/negative  Her axillary ultrasound was performed there was a suspicious lymph node which was biopsied and negative (this is concordant with pathology radiology findings)  An MRI was recommended and performed which demonstrated additional findings in the left side as well as at the right axillary region and bilateral ultrasounds and possible biopsies were recommended    Patient presents now for for opinion  She presents with her mother  Review of Systems:   Review of Systems   Constitutional: Negative for chills and fever  HENT: Negative for ear pain and sore throat  Eyes: Negative for pain and visual disturbance  Respiratory: Negative for cough and shortness of breath  Cardiovascular: Negative for chest pain and palpitations  Gastrointestinal: Negative for abdominal pain and vomiting  Genitourinary: Negative for dysuria and hematuria  Musculoskeletal: Negative for arthralgias and back pain  Skin: Negative for color change and rash  Neurological: Negative for seizures and syncope  All other systems reviewed and are negative        Past Medical History:      Patient Active Problem List   Diagnosis    30 weeks gestation of pregnancy    Fall    41 weeks gestation of pregnancy     (spontaneous vaginal delivery)    Malignant neoplasm of upper-outer quadrant of left breast in female, estrogen receptor positive ([facility] [address] )        Past Medical History:   Diagnosis Date    Varicella     had as child        Past Surgical History:   Procedure Laterality Date    CERVICAL BIOPSY  W/ LOOP ELECTRODE EXCISION      COLPOSCOPY      US BREAST NEEDLE LOC LEFT Left 2022    US GUIDANCE BREAST BIOPSY LEFT EACH ADDITIONAL Left 2022    US GUIDED BREAST BIOPSY LEFT COMPLETE Left 2022    US GUIDED BREAST BIOPSY RIGHT COMPLETE Right 2022    US GUIDED BREAST LYMPH NODE BIOPSY LEFT Left 2022        Family History   Problem Relation Age of Onset    [de-identified] / Stillbirths Mother     No Known Problems Father     Aneurysm Brother     No Known Problems Maternal Aunt     Breast cancer Maternal Grandmother 52    Heart disease Maternal Grandfather     No Known Problems Paternal Grandmother     No Known Problems Paternal Grandfather     Neural tube defect Other     Kidney disease Other         Social History     Socioeconomic History    Marital status: /Civil Union     Spouse name: Not on file    Number of children: Not on file    Years of education: Not on file    Highest education level: Not on file   Occupational History    Not on file   Tobacco Use    Smoking status: Never Smoker    Smokeless tobacco: Never Used   Substance and Sexual Activity    Alcohol use: No    Drug use: No    Sexual activity: Yes     Partners: Male   Other Topics Concern    Not on file   Social History Narrative    Not on file     Social Determinants of Health     Financial Resource Strain: Not on file   Food Insecurity: Not on file   Transportation Needs: Not on file   Physical Activity: Not on file   Stress: Not on file   Social Connections: Not on file   Intimate Partner Violence: Not on file   Housing Stability: Not on file      No current outpatient medications on file  No Known Allergies    Physical Exam:     Vitals:    05/16/22 0937   BP: 120/74   Pulse: 100   Resp: 16   Temp: (!) 97 3 °F (36 3 °C)   SpO2: 99%     Physical Exam  Vitals reviewed  Constitutional:       Appearance: She is well-developed  HENT:      Head: Normocephalic and atraumatic  Eyes:      Pupils: Pupils are equal, round, and reactive to light  Neck:      Thyroid: No thyromegaly  Vascular: No JVD  Trachea: No tracheal deviation  Cardiovascular:      Rate and Rhythm: Normal rate and regular rhythm  Heart sounds: Normal heart sounds  No murmur heard  No friction rub  No gallop  Pulmonary:      Effort: Pulmonary effort is normal  No respiratory distress  Breath sounds: Normal breath sounds  No wheezing or rales  Chest:          Comments: Examination the right breast demonstrates minimal ecchymosis  There is a slight hematoma in the region of her biopsy  There are no other masses within the breast there is no nipple discharge there is no axillary adenopathy  Examination of the left breast demonstrates of palpable abnormality which was originally the presenting finding  Patient states this is been essentially unchanged  There are no other masses within the breast   She has no axillary adenopathy  There are no worrisome skin findings or nipple discharge  Abdominal:      General: There is no distension  Palpations: Abdomen is soft  There is no hepatomegaly or mass  Tenderness: There is no abdominal tenderness  There is no guarding or rebound  Musculoskeletal:         General: No tenderness  Normal range of motion  Cervical back: Normal range of motion and neck supple  Lymphadenopathy:      Cervical: No cervical adenopathy  Skin:     General: Skin is warm and dry  Findings: No erythema or rash  Neurological:      Mental Status: She is alert and oriented to person, place, and time  Cranial Nerves: No cranial nerve deficit  Psychiatric:         Behavior: Behavior normal          Results:   I reviewed her mammogram, and MRI findings  I agree she has other areas on the left breast meritorious but further evaluation and/or biopsy  Discussion/Summary:   I had a long conversation with the patient describing her unifocal lesion and negative nodes which would potentially be treatable with breast conservation (lumpectomy and radiation therapy as well as sentinel lymph node biopsy)  However there are also other lesions based on the MRI finding that could potentially be malignant and mandate a mastectomy  All of this information was conveyed to the patient and her mother  We talked about the biology of the tumor being strongly ER DE positive and HER2 negative which is a favorable biology  However given the size of the tumor she is potentially within the window of chemotherapy and I have recommended a mammaprint test to help delineate the role chemotherapy might play  This is also important early on so that patient can be better educated and or plan for family planning  Patient does not currently have insurance    We will get her in contact with the financial counselors  I also offered a social work interaction  We will try to have her genetic testing initiated today  I will see her back following her results for further discussion  I did explain that my next surgical date is out in July and will be doing whatever we can to try to move that up  Also explained to have other partners to could potentially operate sooner  At the current time they prefer to stay with me though we will readdress this issue  All questions were answered to their satisfaction  Advance Care Planning/Advance Directives:  I discussed the disease status, treatment plans and follow-up with the patient

## 2022-05-16 NOTE — PROGRESS NOTES
Call placed to patient regarding recommendation for;    __X___ RIGHT ___X___LEFT      __X___Ultrasound guided  ______Stereotactic breast biopsy  Pt states that procedure was explained to her, additional questions answered at this time    __X___Verbalized understanding        Blood thinners: No: __X___ Yes: _____ What:     Biopsy teaching sheet given:  _____yes __X__no     Pt given name/# for any further questions/needs    Pt agreeable to a post procedure call

## 2022-05-17 ENCOUNTER — PATIENT OUTREACH (OUTPATIENT)
Dept: HEMATOLOGY ONCOLOGY | Facility: CLINIC | Age: 37
End: 2022-05-17

## 2022-05-17 ENCOUNTER — HOSPITAL ENCOUNTER (OUTPATIENT)
Dept: ULTRASOUND IMAGING | Facility: CLINIC | Age: 37
Discharge: HOME | End: 2022-05-17
Admitting: RADIOLOGY
Payer: COMMERCIAL

## 2022-05-17 ENCOUNTER — HOSPITAL ENCOUNTER (OUTPATIENT)
Dept: ULTRASOUND IMAGING | Facility: CLINIC | Age: 37
Discharge: HOME | End: 2022-05-17
Payer: COMMERCIAL

## 2022-05-17 ENCOUNTER — HOSPITAL ENCOUNTER (OUTPATIENT)
Dept: MAMMOGRAPHY | Facility: CLINIC | Age: 37
Discharge: HOME | End: 2022-05-17
Payer: COMMERCIAL

## 2022-05-17 VITALS — HEART RATE: 83 BPM | DIASTOLIC BLOOD PRESSURE: 89 MMHG | SYSTOLIC BLOOD PRESSURE: 120 MMHG

## 2022-05-17 DIAGNOSIS — Z17.0 MALIGNANT NEOPLASM OF UPPER-OUTER QUADRANT OF LEFT BREAST IN FEMALE, ESTROGEN RECEPTOR POSITIVE (HCC): ICD-10-CM

## 2022-05-17 DIAGNOSIS — C50.412 MALIGNANT NEOPLASM OF UPPER-OUTER QUADRANT OF LEFT BREAST IN FEMALE, ESTROGEN RECEPTOR POSITIVE (HCC): ICD-10-CM

## 2022-05-17 DIAGNOSIS — Z17.0 MALIGNANT NEOPLASM OF UPPER-OUTER QUADRANT OF LEFT BREAST IN FEMALE, ESTROGEN RECEPTOR POSITIVE (HCC): Primary | ICD-10-CM

## 2022-05-17 DIAGNOSIS — R92.8 ABNORMAL MAMMOGRAM: ICD-10-CM

## 2022-05-17 DIAGNOSIS — C50.412 MALIGNANT NEOPLASM OF UPPER-OUTER QUADRANT OF LEFT BREAST IN FEMALE, ESTROGEN RECEPTOR POSITIVE (HCC): Primary | ICD-10-CM

## 2022-05-17 PROCEDURE — 88361 TUMOR IMMUNOHISTOCHEM/COMPUT: CPT | Performed by: PATHOLOGY

## 2022-05-17 PROCEDURE — 88342 IMHCHEM/IMCYTCHM 1ST ANTB: CPT | Performed by: PATHOLOGY

## 2022-05-17 PROCEDURE — 88341 IMHCHEM/IMCYTCHM EA ADD ANTB: CPT | Performed by: PATHOLOGY

## 2022-05-17 PROCEDURE — 88305 TISSUE EXAM BY PATHOLOGIST: CPT | Performed by: PATHOLOGY

## 2022-05-17 PROCEDURE — 19083 BX BREAST 1ST LESION US IMAG: CPT

## 2022-05-17 PROCEDURE — A4648 IMPLANTABLE TISSUE MARKER: HCPCS

## 2022-05-17 PROCEDURE — 76642 ULTRASOUND BREAST LIMITED: CPT

## 2022-05-17 RX ORDER — LIDOCAINE HYDROCHLORIDE 10 MG/ML
5 INJECTION, SOLUTION EPIDURAL; INFILTRATION; INTRACAUDAL; PERINEURAL ONCE
Status: COMPLETED | OUTPATIENT
Start: 2022-05-17 | End: 2022-05-17

## 2022-05-17 RX ADMIN — LIDOCAINE HYDROCHLORIDE 5 ML: 10 INJECTION, SOLUTION EPIDURAL; INFILTRATION; INTRACAUDAL; PERINEURAL at 13:33

## 2022-05-17 NOTE — DISCHARGE INSTR - OTHER ORDERS
POST LARGE CORE BREAST BIOPSY PATIENT INFORMATION      Place an ice pack inside your bra over the top of the dressing every hour for 20 minutes (20 minutes on, 60 minutes off)  Do this until bedtime  Do not shower or bathe until the following morning  After bathing, you may remove the bandaid  You may bathe your breast carefully with the steri-strips in place  Be careful not to loosen them  The steri-strips will fall off in 3-5 days  You may have mild discomfort, and you may have some bruising where the needle entered the skin  This should clear within 5-7 days  If you need medicine for discomfort, take acetaminophen products such as Tylenol  You may also take Advil or Motrin products  DO NOT use Aspirin for the first 24 hours  Do not participate in strenuous activities such as-tennis, aerobics, weight lifting, etc  for 24 hours  Refrain from swimming/soaking for 72 hours  Wearing a bra for sleeping may be more comfortable for the first 24-48 hours after your biopsy  Watch for continued bleeding, pain or fever over 101  If any of these symptoms occur, please contact our breast nurse navigator at the location where your biopsy was performed  During normal business hours (7:30am - 4:00pm) please call the nurse navigator at the site     where your procedure was performed:       [facility] [address]: [phone] or [phone] [address]: [phone] or [phone]     [address]: [address] [facility]: [address] [facility] [facility] [address]: [phone]        After 4pm - please call your physician or go to the nearest Emergency Department location  The final results of your biopsy are usually available within one week

## 2022-05-17 NOTE — PROGRESS NOTES
Breast Oncology Nurse Navigator    Second attempt made for outreach  Patient answered the phone  I introduced myself and my role  Patient had her consult with Dr Macarena Mata yesterday  Her main concern at this time is that surgery cannot be scheduled until July at this time  Support offered  Going for additional US and possible breast biopsy today to the [facility]  Patient made it clear that she would like the option to have children in the future  Will mail out information on fertility  Patient lives with her 11year old son  She is  but has a partner  She states she has an excellent support system that includes her partner, parents, aunt, other family and friends  Open to speaking with a   Patient wants to keep her fertility options open, stating she wants more children and that this is very important to her  Will send out information on fertility  Patient states she is "against chemo" at this time  Spoke to patient about the [employer] and some of the resources they provide, including literature on talking to young children about a cancer diagnosis and books for young children  She has the current calendar that she received at Dr Mick Araujo office  Patient has my contact information and knows she can call with questions  Spoke on phone for 38 minutes

## 2022-05-17 NOTE — PROGRESS NOTES
Procedure type:    __x___ultrasound guided _____stereotactic    Breast:    __x___Left _____Right    Location: 1 oclock Retro    Needle: 12g Nandini    # of passes: 3    Clip: W W  [employer]    Performed by: Dr Lesa Halsted held for 5 minutes by: Arash Agosto Strips:    ___x__yes _____no    Band aid:    __x___yes_____no    Tape and guaze:    _____yes __x___no    Tolerated procedure:    __x___yes _____no

## 2022-05-18 ENCOUNTER — PATIENT OUTREACH (OUTPATIENT)
Dept: HEMATOLOGY ONCOLOGY | Facility: CLINIC | Age: 37
End: 2022-05-18

## 2022-05-18 NOTE — PROGRESS NOTES
Post procedure call completed    Bleeding: _____yes __X___no    Pain: _____yes ___X___no    Redness/Swelling: ______yes ___X___no    Band aid removed: __X___yes _____no    Steri-Strips intact: ___X___yes _____no (discussed with patient to remove steri strips on 5/22/22  if they have not come off on their own)    Pt with no questions at this time, adv will call when results available, adv to call with any questions or concerns, has name/# for contact

## 2022-05-18 NOTE — PROGRESS NOTES
Breast Oncology Nurse Navigator    Mailed out fertility information from [id number], a brochure from [facility], and a brochure from [facility] as well as our business cards

## 2022-05-19 LAB — MISCELLANEOUS LAB TEST RESULT: NORMAL

## 2022-05-20 ENCOUNTER — PATIENT OUTREACH (OUTPATIENT)
Dept: CASE MANAGEMENT | Facility: HOSPITAL | Age: 37
End: 2022-05-20

## 2022-05-23 ENCOUNTER — TELEPHONE (OUTPATIENT)
Dept: SURGICAL ONCOLOGY | Facility: CLINIC | Age: 37
End: 2022-05-23

## 2022-05-23 ENCOUNTER — TELEPHONE (OUTPATIENT)
Dept: GYNECOLOGIC ONCOLOGY | Facility: CLINIC | Age: 37
End: 2022-05-23

## 2022-05-23 DIAGNOSIS — C50.412 MALIGNANT NEOPLASM OF UPPER-OUTER QUADRANT OF LEFT BREAST IN FEMALE, ESTROGEN RECEPTOR POSITIVE (HCC): Primary | ICD-10-CM

## 2022-05-23 DIAGNOSIS — Z17.0 MALIGNANT NEOPLASM OF UPPER-OUTER QUADRANT OF LEFT BREAST IN FEMALE, ESTROGEN RECEPTOR POSITIVE (HCC): Primary | ICD-10-CM

## 2022-05-23 NOTE — TELEPHONE ENCOUNTER
LVM with plastics to call patient to schedule ASAP consult to discuss reconstruction   Gave pts MRN , name, and

## 2022-05-23 NOTE — TELEPHONE ENCOUNTER
I explained the patient that her 2nd biopsy was also cancer  It was sufficiently far enough away from her original cancer the that it would require mastectomy  We had previously talked about that she did recall this  She is interested in reconstruction  I will coordinate appoint with Dr Chari Barbour  We will see her back for definitive surgical planning

## 2022-05-24 ENCOUNTER — TELEPHONE (OUTPATIENT)
Dept: PLASTIC SURGERY | Facility: CLINIC | Age: 37
End: 2022-05-24

## 2022-05-26 ENCOUNTER — TELEPHONE (OUTPATIENT)
Dept: GENETICS | Facility: CLINIC | Age: 37
End: 2022-05-26

## 2022-05-26 NOTE — TELEPHONE ENCOUNTER
I spoke with Lita Contreras to review the result of her STAT genetic test     Test: [facility] BRCAplus STAT Panel (8 genes): KIRSTIN, BRCA1, BRCA2, CDH1, CHEK2, PALB2, PTEN, TP53    Initial results will take approximately 5-12 days to return     Additional results may take up to 2-3 weeks to complete once test is started  Result:   Negative - No Clinically Significant Variants Detected       Assessment:     Negative   A negative result significantly reduces the likelihood that Lita Contreras has a hereditary cancer syndrome related to the high-risk breast cancer genes listed above  This result does not have surgical implications and surgical options should be discussed with her healthcare provider  Additional Testing: The Reflex to [facility] (28 additional genes): APC, AXIN2 BARD1, BRIP1, BMPR1A, CDK4, CDKN2A, DICER1, EPCAM, GREM1, HOXB13, MLH1, MSH2, MSH3, MSH6, MUTYH, NBN, NF1, NTHL1, PMS2, POLD1, POLE, RAD51C, RAD51D, RECQL SMAD4, SMARCA4, STK11 test is pending  We will contact Lita Contreras once results are available  Additional recommendations for surveillance/medical management will be made upon final genetic test result  We will mail a copy of the test result and consult note upon completion of the final result  Rajani's surgeon was made aware of this test result

## 2022-05-27 LAB — SCAN RESULT: NORMAL

## 2022-05-31 ENCOUNTER — TELEPHONE (OUTPATIENT)
Dept: SURGICAL ONCOLOGY | Facility: CLINIC | Age: 37
End: 2022-05-31

## 2022-05-31 NOTE — TELEPHONE ENCOUNTER
Called patient to follow up with the plans for her surgery as well as review her MammaPrint test results  There was no answer; message was left with direct call back number provided

## 2022-05-31 NOTE — TELEPHONE ENCOUNTER
Patient returned the phone call  Reviewed the MammaPrint results with her that came back high risk  Patient states that she saw the results already when they were scanned into her [employer] account on Friday  Patient states that she decided to go in another direction and will be having her treatment at [facility]  Patient had no upcoming appointment scheduled with Dr Ann Grimm as we were waiting on a surgical date  Instructed patient to call our office if we could be of any assistance in the future  Patient verbalized understanding and was appreciative

## 2022-06-02 ENCOUNTER — TELEPHONE (OUTPATIENT)
Dept: GENETICS | Facility: CLINIC | Age: 37
End: 2022-06-02

## 2022-06-02 NOTE — TELEPHONE ENCOUNTER
Genetic Test Result Note:    Today I spoke with Beryle Ashing over the phone to review the results of her genetic test for hereditary cancer  She underwent genetic testing through our program on 5/6/2022 due to her recent diagnosis of breast cancer  Her results will be sent to her breast surgeon Britany West MD     A separate report of her STAT genetic test results were disclosed to her on 5/26/2022 by Quang Rob MA  This result includes new genes and does not change her initial genetic test result  SUMMARY:    Test(s): Reflex to [facility] CancerNext (28 additional genes): APC, AXIN2 BARD1, BRIP1, BMPR1A, CDK4, CDKN2A, DICER1, EPCAM, GREM1, HOXB13, MLH1, MSH2, MSH3, MSH6, MUTYH, NBN, NF1, NTHL1, PMS2, POLD1, POLE, RAD51C, RAD51D, RECQL SMAD4, SMARCA4, STK11     Result: Negative - No Clinically Significant Variants Detected      Assessment:   A negative result significantly reduces the likelihood that Beryle Ashing has a hereditary cancer syndrome  However, this testing is unable to completely rule out the presence of hereditary cancer  It remains possible that:  - There is a variant in an area of a gene which was not tested or there is a variant not detectable due to technical limitations of this test      - There is a variant in another gene that was not included in this test or in a gene not known to be linked to cancer or tumors  - A family member has a genetic variant that the patient did not inherit  - The cancer in the family is sporadic and is related to non-hereditary factors  Risks and Testing for Family Members:  Beryle Ashing was made aware that all of her first-degree relatives are at increased risk to develop breast cancer based on her recent diagnosis  We recommend that her first-degree relatives make their healthcare providers aware of the family history and discuss their options for screening and risk-reduction  At this time we do not recommend testing for Beryle Ashing 's children based on her negative test result  Natalia Knapp 's children still need to consider the history of cancer on the other side of their family when determining their risks  If Natalia Knapp has any affected family members with a cancer diagnosis, especially at a young age, they may still consider genetic testing  Relatives who wish to pursue genetic testing can reach out to the [facility] [id number]) to schedule an appointment or visit www [facility] org to identify a local genetic counselor  Plan:     Negative Result: This result does not have surgical implications and surgical options should be discussed with her healthcare provider   Rajani's breast surgeon, Dr Lucy Castro will be made aware of her results

## 2022-06-03 ENCOUNTER — PATIENT OUTREACH (OUTPATIENT)
Dept: CASE MANAGEMENT | Facility: HOSPITAL | Age: 37
End: 2022-06-03

## 2022-06-03 NOTE — PROGRESS NOTES
Chart review completed this morning, pt has decided to transfer her care to [facility], has no scheduled appointments with [facility] at this time  OncSW referral will be closed, however should pt change her mind and reschedule with [facility] the OncSW team will remain available to her

## 2022-06-07 ENCOUNTER — TELEPHONE (OUTPATIENT)
Dept: GENETICS | Facility: CLINIC | Age: 37
End: 2022-06-07

## 2022-06-07 NOTE — TELEPHONE ENCOUNTER
----- Message from Osorio Harris sent at 6/2/2022  1:25 PM EDT -----  GC Completed Chart     Result Type: Negative    Result Delivery: [employer] Message    Monthly Review: Does not need monthly review- COMPLETE